# Patient Record
Sex: FEMALE | Race: WHITE | NOT HISPANIC OR LATINO | Employment: OTHER | URBAN - METROPOLITAN AREA
[De-identification: names, ages, dates, MRNs, and addresses within clinical notes are randomized per-mention and may not be internally consistent; named-entity substitution may affect disease eponyms.]

---

## 2023-03-09 ENCOUNTER — OFFICE VISIT (OUTPATIENT)
Dept: PRIMARY CARE | Facility: CLINIC | Age: 63
End: 2023-03-09
Payer: COMMERCIAL

## 2023-03-09 VITALS
HEART RATE: 70 BPM | SYSTOLIC BLOOD PRESSURE: 126 MMHG | BODY MASS INDEX: 32.77 KG/M2 | DIASTOLIC BLOOD PRESSURE: 80 MMHG | WEIGHT: 185 LBS

## 2023-03-09 DIAGNOSIS — M25.562 ACUTE PAIN OF LEFT KNEE: Primary | ICD-10-CM

## 2023-03-09 PROBLEM — M54.16 LUMBAR RADICULOPATHY: Status: ACTIVE | Noted: 2023-03-09

## 2023-03-09 PROBLEM — C50.919 BREAST CANCER (MULTI): Status: ACTIVE | Noted: 2023-03-09

## 2023-03-09 PROBLEM — E55.9 VITAMIN D DEFICIENCY: Status: ACTIVE | Noted: 2023-03-09

## 2023-03-09 PROBLEM — K63.5 POLYP OF COLON: Status: ACTIVE | Noted: 2023-03-09

## 2023-03-09 PROBLEM — H53.9 VISION DISTURBANCE: Status: ACTIVE | Noted: 2023-03-09

## 2023-03-09 PROBLEM — I10 HYPERTENSION: Status: ACTIVE | Noted: 2023-03-09

## 2023-03-09 PROBLEM — M25.512 CHRONIC LEFT SHOULDER PAIN: Status: ACTIVE | Noted: 2023-03-09

## 2023-03-09 PROBLEM — I44.7 LBBB (LEFT BUNDLE BRANCH BLOCK): Status: ACTIVE | Noted: 2023-03-09

## 2023-03-09 PROBLEM — G89.29 CHRONIC LEFT SHOULDER PAIN: Status: ACTIVE | Noted: 2023-03-09

## 2023-03-09 PROBLEM — R79.89 ELEVATED SERUM CREATININE: Status: ACTIVE | Noted: 2023-03-09

## 2023-03-09 PROBLEM — K21.9 ACID REFLUX DISEASE: Status: ACTIVE | Noted: 2023-03-09

## 2023-03-09 PROBLEM — M24.131 DEGENERATIVE TEAR OF TRIANGULAR FIBROCARTILAGE COMPLEX (TFCC) OF RIGHT WRIST: Status: ACTIVE | Noted: 2023-03-09

## 2023-03-09 PROBLEM — M17.12 PRIMARY OSTEOARTHRITIS OF LEFT KNEE: Status: ACTIVE | Noted: 2023-03-09

## 2023-03-09 PROBLEM — Q66.70 HIGH FOOT ARCH: Status: ACTIVE | Noted: 2023-03-09

## 2023-03-09 PROBLEM — E03.9 HYPOTHYROIDISM: Status: ACTIVE | Noted: 2023-03-09

## 2023-03-09 PROBLEM — N95.2 ATROPHY OF VAGINA: Status: ACTIVE | Noted: 2023-03-09

## 2023-03-09 PROBLEM — E78.5 HYPERLIPEMIA: Status: ACTIVE | Noted: 2023-03-09

## 2023-03-09 PROBLEM — M25.579 JOINT PAIN OF ANKLE AND FOOT, UNSPECIFIED LATERALITY: Status: ACTIVE | Noted: 2023-03-09

## 2023-03-09 PROBLEM — M48.061 LUMBAR CANAL STENOSIS: Status: ACTIVE | Noted: 2023-03-09

## 2023-03-09 PROCEDURE — 99214 OFFICE O/P EST MOD 30 MIN: CPT | Performed by: FAMILY MEDICINE

## 2023-03-09 PROCEDURE — 20610 DRAIN/INJ JOINT/BURSA W/O US: CPT | Performed by: FAMILY MEDICINE

## 2023-03-09 PROCEDURE — 3079F DIAST BP 80-89 MM HG: CPT | Performed by: FAMILY MEDICINE

## 2023-03-09 PROCEDURE — 4004F PT TOBACCO SCREEN RCVD TLK: CPT | Performed by: FAMILY MEDICINE

## 2023-03-09 PROCEDURE — 3074F SYST BP LT 130 MM HG: CPT | Performed by: FAMILY MEDICINE

## 2023-03-09 RX ORDER — MECLIZINE HYDROCHLORIDE 25 MG/1
25 TABLET ORAL 3 TIMES DAILY PRN
COMMUNITY
Start: 2022-08-17

## 2023-03-09 RX ORDER — ATORVASTATIN CALCIUM 20 MG/1
20 TABLET, FILM COATED ORAL DAILY
COMMUNITY
End: 2024-04-02 | Stop reason: ALTCHOICE

## 2023-03-09 RX ORDER — CYCLOSPORINE 0.5 MG/ML
1 EMULSION OPHTHALMIC EVERY 12 HOURS
COMMUNITY
Start: 2023-02-28

## 2023-03-09 RX ORDER — LATANOPROST 50 UG/ML
SOLUTION/ DROPS OPHTHALMIC
COMMUNITY
Start: 2018-08-10

## 2023-03-09 RX ORDER — PANTOPRAZOLE SODIUM 40 MG/1
40 TABLET, DELAYED RELEASE ORAL
COMMUNITY

## 2023-03-09 RX ORDER — ERYTHROMYCIN 5 MG/G
OINTMENT OPHTHALMIC NIGHTLY
COMMUNITY
Start: 2022-07-06

## 2023-03-09 RX ORDER — BENZONATATE 200 MG/1
200 CAPSULE ORAL 3 TIMES DAILY PRN
COMMUNITY
End: 2023-03-09

## 2023-03-09 RX ORDER — ROSUVASTATIN CALCIUM 40 MG/1
1 TABLET, COATED ORAL DAILY
COMMUNITY
Start: 2018-11-19 | End: 2023-04-27

## 2023-03-09 RX ORDER — OLMESARTAN MEDOXOMIL 40 MG/1
20 TABLET ORAL DAILY
COMMUNITY
End: 2023-08-01 | Stop reason: ALTCHOICE

## 2023-03-09 RX ORDER — SOD SULF/POT CHLORIDE/MAG SULF 1.479 G
TABLET ORAL
COMMUNITY
End: 2023-03-09 | Stop reason: ALTCHOICE

## 2023-03-09 RX ORDER — OMEPRAZOLE 40 MG/1
1 CAPSULE, DELAYED RELEASE ORAL DAILY
COMMUNITY
Start: 2021-09-27 | End: 2023-08-01 | Stop reason: WASHOUT

## 2023-03-09 RX ORDER — OLMESARTAN MEDOXOMIL AND HYDROCHLOROTHIAZIDE 40/25 40; 25 MG/1; MG/1
1 TABLET ORAL DAILY
COMMUNITY
Start: 2018-02-06 | End: 2023-06-06 | Stop reason: SDUPTHER

## 2023-03-09 RX ORDER — DOXYCYCLINE 100 MG/1
100 TABLET ORAL
COMMUNITY
End: 2023-03-09

## 2023-03-09 RX ORDER — TRIAMCINOLONE ACETONIDE 40 MG/ML
40 INJECTION, SUSPENSION INTRA-ARTICULAR; INTRAMUSCULAR ONCE
Status: COMPLETED | OUTPATIENT
Start: 2023-03-09 | End: 2023-05-22

## 2023-03-09 RX ORDER — LEVOTHYROXINE SODIUM 100 UG/1
1 TABLET ORAL DAILY
COMMUNITY
Start: 2018-08-10 | End: 2023-03-30 | Stop reason: SDUPTHER

## 2023-03-09 ASSESSMENT — PATIENT HEALTH QUESTIONNAIRE - PHQ9
2. FEELING DOWN, DEPRESSED OR HOPELESS: NOT AT ALL
SUM OF ALL RESPONSES TO PHQ9 QUESTIONS 1 AND 2: 0
1. LITTLE INTEREST OR PLEASURE IN DOING THINGS: NOT AT ALL

## 2023-03-09 NOTE — PROGRESS NOTES
Subjective   Italia Pierce is a 62 y.o. female who presents for left knee pain.    HPI    L knee pain:  States pain started yesterday after biking 8 miles, also biked 11 miles two days ago  Denies specific trauma or injury   Weight baring or pivoting motions makes the pain much worse   Feels unstable on her knee and is using walker today   Denies numbness, tingling    Has tried ibuprofen, ice, rest, elevation.     ROS:   All pertinent positive symptoms are included in the history of present illness.  All other systems have been reviewed and are negative and noncontributory to this patient's current ailments.    Objective     /80   Pulse 70   Wt 83.9 kg (185 lb)   BMI 32.77 kg/m²     Physical Exam  CONSTITUTIONAL - well nourished, well developed, looks like stated age, in no acute distress, not ill-appearing, and not tired appearing  SKIN - normal skin color and pigmentation, normal skin turgor without rash, lesions, or nodules visualized  HEAD - no trauma, normocephalic  EYES - extraocular muscles are intact, and normal external exam  EXTREMITIES - no obvious or evident edema, no obvious or evident deformities  MSK -left knee without any redness, swelling, ecchymosis; unable to reproduce any joint line tenderness; MCL tenderness, there is some slight swelling on the medial aspect, but again no redness or warmth; no LCL tenderness; negative anterior and posterior drawer, negative Sepideh    NEUROLOGICAL - antalgic gait, alert, oriented   PSYCHIATRIC - alert, pleasant and cordial, age-appropriate    Patient ID: Italia Pierce is a 62 y.o. female.    Joint Injection Large/Arthrocentesis: L knee on 3/9/2023 3:28 PM  Indications: pain  Details: 22 G needle, anterolateral approach  Outcome: tolerated well, no immediate complications  Procedure, treatment alternatives, risks and benefits explained, specific risks discussed. Immediately prior to procedure a time out was called to verify the correct patient,  "procedure, equipment, support staff and site/side marked as required. Patient was prepped and draped in the usual sterile fashion.       Assessment/Plan   Acute pain of left knee  Concern for possible meniscal versus MCL injury Steroid injection provided today     Indications, potential risks, complications and side effects, alternatives, and potential outcomes for the injection procedure was discussed with the patient: joint was prepped in a sterile fashion, 22 gauge with 1.5\" length needle was used for injection.    2% lidocaine (2 mL) and Kenalog 40 mg injected into the joint as noted on the examination. It was successful, without complication, and tolerated extremely well.    If there is no pain within the next week, we talked about considering an MRI to further evaluate for underlying intra articular damage.  I reviewed x-ray from previous visit, there was some mild narrowing, but no other significant arthritic changes.    Please return to the clinic if pain, swelling, or signs of infection occur. It will be necessary to further evaluate you at that time, along with considering the possibility of an orthopedic consultation to provide further medical management  "

## 2023-03-13 ENCOUNTER — TELEPHONE (OUTPATIENT)
Dept: PRIMARY CARE | Facility: CLINIC | Age: 63
End: 2023-03-13
Payer: COMMERCIAL

## 2023-03-13 DIAGNOSIS — M25.562 ACUTE PAIN OF LEFT KNEE: ICD-10-CM

## 2023-03-13 DIAGNOSIS — M17.12 PRIMARY OSTEOARTHRITIS OF LEFT KNEE: Primary | ICD-10-CM

## 2023-03-22 DIAGNOSIS — E03.9 HYPOTHYROIDISM, UNSPECIFIED: ICD-10-CM

## 2023-03-23 RX ORDER — LEVOTHYROXINE SODIUM 100 UG/1
TABLET ORAL
Qty: 90 TABLET | Refills: 3 | OUTPATIENT
Start: 2023-03-23

## 2023-03-26 DIAGNOSIS — S82.192A: Primary | ICD-10-CM

## 2023-03-26 DIAGNOSIS — S83.242A OTHER TEAR OF MEDIAL MENISCUS OF LEFT KNEE AS CURRENT INJURY, INITIAL ENCOUNTER: ICD-10-CM

## 2023-03-26 NOTE — PROGRESS NOTES
MRI of the left knee reveals a bone marrow bruise, full-thickness osteochondral defect in the medial tibial condyle and a horizontal tear of the medial meniscus.  I reviewed this on the phone with Italia a few moments ago, and we agreed to get her set up with orthopedic surgeon to discuss treatment options further

## 2023-03-30 RX ORDER — LEVOTHYROXINE SODIUM 100 UG/1
100 TABLET ORAL DAILY
Qty: 90 TABLET | Refills: 1 | Status: SHIPPED | OUTPATIENT
Start: 2023-03-30 | End: 2023-08-10 | Stop reason: SDUPTHER

## 2023-04-26 DIAGNOSIS — E78.5 HYPERLIPIDEMIA, UNSPECIFIED: ICD-10-CM

## 2023-04-27 RX ORDER — ROSUVASTATIN CALCIUM 40 MG/1
TABLET, COATED ORAL
Qty: 30 TABLET | Refills: 0 | Status: SHIPPED | OUTPATIENT
Start: 2023-04-27 | End: 2023-06-02

## 2023-05-22 ENCOUNTER — OFFICE VISIT (OUTPATIENT)
Dept: PRIMARY CARE | Facility: CLINIC | Age: 63
End: 2023-05-22
Payer: COMMERCIAL

## 2023-05-22 VITALS
SYSTOLIC BLOOD PRESSURE: 130 MMHG | BODY MASS INDEX: 32.77 KG/M2 | DIASTOLIC BLOOD PRESSURE: 70 MMHG | WEIGHT: 185 LBS | HEART RATE: 70 BPM

## 2023-05-22 DIAGNOSIS — Z17.1 MALIGNANT NEOPLASM OF UPPER-INNER QUADRANT OF RIGHT BREAST IN FEMALE, ESTROGEN RECEPTOR NEGATIVE (MULTI): ICD-10-CM

## 2023-05-22 DIAGNOSIS — M17.12 PRIMARY OSTEOARTHRITIS OF LEFT KNEE: Primary | ICD-10-CM

## 2023-05-22 DIAGNOSIS — C50.211 MALIGNANT NEOPLASM OF UPPER-INNER QUADRANT OF RIGHT BREAST IN FEMALE, ESTROGEN RECEPTOR NEGATIVE (MULTI): ICD-10-CM

## 2023-05-22 PROBLEM — J06.9 URI, ACUTE: Status: ACTIVE | Noted: 2023-05-22

## 2023-05-22 PROBLEM — S82.202A FRACTURE OF LEFT TIBIA: Status: ACTIVE | Noted: 2023-05-22

## 2023-05-22 PROBLEM — M25.562 LEFT KNEE PAIN: Status: ACTIVE | Noted: 2023-05-22

## 2023-05-22 PROCEDURE — 20610 DRAIN/INJ JOINT/BURSA W/O US: CPT | Performed by: FAMILY MEDICINE

## 2023-05-22 PROCEDURE — 1160F RVW MEDS BY RX/DR IN RCRD: CPT | Performed by: FAMILY MEDICINE

## 2023-05-22 PROCEDURE — 4004F PT TOBACCO SCREEN RCVD TLK: CPT | Performed by: FAMILY MEDICINE

## 2023-05-22 PROCEDURE — 3078F DIAST BP <80 MM HG: CPT | Performed by: FAMILY MEDICINE

## 2023-05-22 PROCEDURE — 99213 OFFICE O/P EST LOW 20 MIN: CPT | Performed by: FAMILY MEDICINE

## 2023-05-22 PROCEDURE — 3075F SYST BP GE 130 - 139MM HG: CPT | Performed by: FAMILY MEDICINE

## 2023-05-22 PROCEDURE — 1159F MED LIST DOCD IN RCRD: CPT | Performed by: FAMILY MEDICINE

## 2023-05-22 RX ORDER — OFLOXACIN 3 MG/ML
SOLUTION AURICULAR (OTIC)
COMMUNITY
Start: 2023-05-13 | End: 2023-08-01 | Stop reason: WASHOUT

## 2023-05-22 RX ADMIN — TRIAMCINOLONE ACETONIDE 40 MG: 40 INJECTION, SUSPENSION INTRA-ARTICULAR; INTRAMUSCULAR at 13:15

## 2023-05-22 ASSESSMENT — PATIENT HEALTH QUESTIONNAIRE - PHQ9
SUM OF ALL RESPONSES TO PHQ9 QUESTIONS 1 AND 2: 0
2. FEELING DOWN, DEPRESSED OR HOPELESS: NOT AT ALL
1. LITTLE INTEREST OR PLEASURE IN DOING THINGS: NOT AT ALL

## 2023-05-22 ASSESSMENT — ENCOUNTER SYMPTOMS
OCCASIONAL FEELINGS OF UNSTEADINESS: 0
DEPRESSION: 0
LOSS OF SENSATION IN FEET: 0

## 2023-05-22 NOTE — PROGRESS NOTES
Subjective   Italia Pierce is a 63 y.o. female who presents for cortisone injection (Left knee)    HPI  Presents for repeat left knee injection.  She has had several injections now, and they seem to last around 2 months for her.  States that her last injection wore off 1 to 2 weeks ago.  She has been putting off surgery, and wants to at least make it through the summer before going down that road.  Denies interval injury to the left knee.      ROS: All pertinent positive symptoms are included in the history of present illness.    All other systems have been reviewed and are negative and noncontributory to this patient's current ailments.    Objective     Vitals:    05/22/23 1302   BP: 130/70   Pulse: 70   Weight: 83.9 kg (185 lb)       Physical Exam  CONSTITUTIONAL - well nourished, well developed, looks like stated age, in no acute distress, not ill-appearing, and not tired appearing  SKIN - No lesions or rashes visualized.   HEAD - Atraumatic, normocephalic.  EYES - EOMI with normal external exam  RESP - respiration not labored   CARDIAC - extremities warm, well-perfused  PSYCHIATRIC - alert, oriented to time, place, person and no difficulty with speech or language  MUSCULOSKELETAL -left knee demonstrates medial joint line tenderness palpation, stable varus/valgus stress    Patient ID: Italia Pierce is a 63 y.o. female.    Joint Injection Large/Arthrocentesis: L knee on 5/22/2023 1:15 PM  Indications: pain  Details: 22 G needle, anterolateral approach  Medications: 40 mg triamcinolone acetonide (Kenalog-40) injection 40 mg/mL (40 mg triamcinolone, 2 mL 2% lidocaine)  Outcome: tolerated well, no immediate complications  Procedure, treatment alternatives, risks and benefits explained, specific risks discussed. Consent was given by the patient.       Assessment/Plan   Problem List Items Addressed This Visit       Breast cancer (CMS/[facility])    Primary osteoarthritis of left knee - Primary     Left knee cortisone  injection provided today.  We can continue to perform these every 3 to 6 months as necessary         Relevant Orders    Joint Injection Large/Arthrocentesis: L knee (Completed)

## 2023-05-22 NOTE — ASSESSMENT & PLAN NOTE
Left knee cortisone injection provided today.  We can continue to perform these every 3 to 6 months as necessary

## 2023-06-01 DIAGNOSIS — E78.5 HYPERLIPIDEMIA, UNSPECIFIED: ICD-10-CM

## 2023-06-02 RX ORDER — ROSUVASTATIN CALCIUM 40 MG/1
40 TABLET, COATED ORAL DAILY
Qty: 30 TABLET | Refills: 0 | Status: SHIPPED | OUTPATIENT
Start: 2023-06-02 | End: 2023-07-06

## 2023-06-06 DIAGNOSIS — I10 HYPERTENSION, UNSPECIFIED TYPE: ICD-10-CM

## 2023-06-06 PROBLEM — Z85.3 HISTORY OF BREAST CANCER: Status: ACTIVE | Noted: 2023-06-06

## 2023-06-06 RX ORDER — OLMESARTAN MEDOXOMIL AND HYDROCHLOROTHIAZIDE 40/25 40; 25 MG/1; MG/1
1 TABLET ORAL DAILY
Qty: 30 TABLET | Refills: 0 | Status: SHIPPED | OUTPATIENT
Start: 2023-06-06 | End: 2023-07-06

## 2023-06-20 ENCOUNTER — APPOINTMENT (OUTPATIENT)
Dept: PRIMARY CARE | Facility: CLINIC | Age: 63
End: 2023-06-20
Payer: COMMERCIAL

## 2023-07-06 DIAGNOSIS — E78.5 HYPERLIPIDEMIA, UNSPECIFIED: ICD-10-CM

## 2023-07-06 DIAGNOSIS — I10 HYPERTENSION, UNSPECIFIED TYPE: ICD-10-CM

## 2023-07-06 RX ORDER — ROSUVASTATIN CALCIUM 40 MG/1
40 TABLET, COATED ORAL DAILY
Qty: 30 TABLET | Refills: 0 | Status: SHIPPED | OUTPATIENT
Start: 2023-07-06 | End: 2023-08-01 | Stop reason: SDUPTHER

## 2023-07-06 RX ORDER — OLMESARTAN MEDOXOMIL AND HYDROCHLOROTHIAZIDE 40/25 40; 25 MG/1; MG/1
1 TABLET ORAL DAILY
Qty: 30 TABLET | Refills: 0 | Status: SHIPPED | OUTPATIENT
Start: 2023-07-06 | End: 2023-08-01 | Stop reason: SDUPTHER

## 2023-07-31 ENCOUNTER — APPOINTMENT (OUTPATIENT)
Dept: PRIMARY CARE | Facility: CLINIC | Age: 63
End: 2023-07-31
Payer: COMMERCIAL

## 2023-07-31 DIAGNOSIS — E78.5 HYPERLIPIDEMIA, UNSPECIFIED: ICD-10-CM

## 2023-08-01 ENCOUNTER — OFFICE VISIT (OUTPATIENT)
Dept: PRIMARY CARE | Facility: CLINIC | Age: 63
End: 2023-08-01
Payer: COMMERCIAL

## 2023-08-01 VITALS
HEART RATE: 75 BPM | HEIGHT: 64 IN | WEIGHT: 190 LBS | DIASTOLIC BLOOD PRESSURE: 64 MMHG | SYSTOLIC BLOOD PRESSURE: 110 MMHG | BODY MASS INDEX: 32.44 KG/M2

## 2023-08-01 DIAGNOSIS — I10 BENIGN ESSENTIAL HYPERTENSION: Primary | ICD-10-CM

## 2023-08-01 DIAGNOSIS — F17.210 CIGARETTE SMOKER: ICD-10-CM

## 2023-08-01 DIAGNOSIS — E55.9 VITAMIN D DEFICIENCY: ICD-10-CM

## 2023-08-01 DIAGNOSIS — Z12.83 SKIN CANCER SCREENING: ICD-10-CM

## 2023-08-01 DIAGNOSIS — E78.2 MIXED HYPERLIPIDEMIA: ICD-10-CM

## 2023-08-01 PROBLEM — M25.562 LEFT KNEE PAIN: Status: RESOLVED | Noted: 2023-05-22 | Resolved: 2023-08-01

## 2023-08-01 PROBLEM — S82.202A FRACTURE OF LEFT TIBIA: Status: RESOLVED | Noted: 2023-05-22 | Resolved: 2023-08-01

## 2023-08-01 PROBLEM — J06.9 URI, ACUTE: Status: RESOLVED | Noted: 2023-05-22 | Resolved: 2023-08-01

## 2023-08-01 PROCEDURE — 3074F SYST BP LT 130 MM HG: CPT | Performed by: FAMILY MEDICINE

## 2023-08-01 PROCEDURE — 3078F DIAST BP <80 MM HG: CPT | Performed by: FAMILY MEDICINE

## 2023-08-01 PROCEDURE — 1160F RVW MEDS BY RX/DR IN RCRD: CPT | Performed by: FAMILY MEDICINE

## 2023-08-01 PROCEDURE — 4004F PT TOBACCO SCREEN RCVD TLK: CPT | Performed by: FAMILY MEDICINE

## 2023-08-01 PROCEDURE — 1159F MED LIST DOCD IN RCRD: CPT | Performed by: FAMILY MEDICINE

## 2023-08-01 PROCEDURE — 99214 OFFICE O/P EST MOD 30 MIN: CPT | Performed by: FAMILY MEDICINE

## 2023-08-01 RX ORDER — ROSUVASTATIN CALCIUM 40 MG/1
40 TABLET, COATED ORAL DAILY
Qty: 30 TABLET | Refills: 0 | OUTPATIENT
Start: 2023-08-01

## 2023-08-01 RX ORDER — ROSUVASTATIN CALCIUM 40 MG/1
40 TABLET, COATED ORAL DAILY
Qty: 90 TABLET | Refills: 1 | Status: SHIPPED | OUTPATIENT
Start: 2023-08-01 | End: 2024-02-21

## 2023-08-01 RX ORDER — OLMESARTAN MEDOXOMIL AND HYDROCHLOROTHIAZIDE 40/25 40; 25 MG/1; MG/1
1 TABLET ORAL DAILY
Qty: 90 TABLET | Refills: 1 | Status: SHIPPED | OUTPATIENT
Start: 2023-08-01 | End: 2023-08-22 | Stop reason: SINTOL

## 2023-08-01 ASSESSMENT — PATIENT HEALTH QUESTIONNAIRE - PHQ9
1. LITTLE INTEREST OR PLEASURE IN DOING THINGS: NOT AT ALL
2. FEELING DOWN, DEPRESSED OR HOPELESS: NOT AT ALL
SUM OF ALL RESPONSES TO PHQ9 QUESTIONS 1 AND 2: 0

## 2023-08-01 NOTE — ASSESSMENT & PLAN NOTE
Really should consider getting back on vitamin D secondary to your breast cancer history  Obtain vitamin D level, labs pending  We will notify of test results once available

## 2023-08-01 NOTE — PROGRESS NOTES
Subjective   Patient ID: Italia Pierce is a 63 y.o. female who presents for Hypertension and Hyperlipidemia.    HPI  1.  Hypertension.  Patient is requesting refill of her olmesartan 40 mg/hydrochlorothiazide 25 mg.   She is tolerating the medication well. Denies any dizziness or swelling in her legs.   Records her blood pressure 3 times weekly.  Average BP is around 110/60 at home.    2.  Hyperlipidemia.  Not fasting for blood work today, but willing to have done in near future  Currently taking rosuvastatin 40 mg daily, tolerating well  Last lipid panel done October 2022, noted to be normal    3.  Longtime cigarette smoker.  Started smoking age 23, currently 63 years of age, and still smoking one half PPD, or 20-pack-year history    4.  Vitamin D deficiency.  History of breast cancer, no longer taking vitamin D  Apparently oncology took her off the vitamin D, but she has not had the level checked in a while  Out of the sun quite often during the summer, especially enjoys golfing, but in the winter she is indoors    5.  Skin lesions.  She reports new nodular dry skin patches appearing sporadically on her legs over the year  They do not itch but she would like treatment if possible.    Review of Systems  All pertinent positive symptoms are included in the history of present illness.    All other systems have been reviewed and are negative and noncontributory to this patient's current ailments.    Current Outpatient Medications   Medication Instructions    atorvastatin (LIPITOR) 20 mg, oral, Daily    erythromycin (Romycin) 5 mg/gram (0.5 %) ophthalmic ointment Both Eyes, Nightly    latanoprost (Xalatan) 0.005 % ophthalmic solution ophthalmic (eye)    levothyroxine (SYNTHROID, LEVOXYL) 100 mcg, oral, Daily    meclizine (ANTIVERT) 25 mg, oral, 3 times daily PRN    olmesartan-hydrochlorothiazide (BENIcar HCT) 40-25 mg tablet 1 tablet, oral, Daily    pantoprazole (PROTONIX) 40 mg, oral, Daily before breakfast     "Restasis 0.05 % ophthalmic emulsion 1 drop, Both Eyes, Every 12 hours    rosuvastatin (CRESTOR) 40 mg, oral, Daily     Allergies   Allergen Reactions    Gadolinium-Containing Contrast Media Nausea Only, Other and Rash    Prochlorperazine Swelling       Immunization History   Administered Date(s) Administered    Influenza, seasonal, injectable 11/01/2022    Pfizer Purple Cap SARS-CoV-2 03/17/2021, 04/16/2021, 11/02/2021    Pneumococcal polysaccharide vaccine, 23-valent, age 2 years and older (PNEUMOVAX 23) 02/05/2020    Zoster, live 11/08/2022     Past Surgical History:   Procedure Laterality Date    BREAST LUMPECTOMY  06/16/2014    Right Breast Lumpectomy    OTHER SURGICAL HISTORY  10/18/2013    Liposuction    OTHER SURGICAL HISTORY  10/18/2013    Otoplasty    OTHER SURGICAL HISTORY  02/10/2020    [place] lymph node biopsy procedure    REFRACTIVE SURGERY  10/18/2013    Corneal LASIK    TONSILLECTOMY  10/18/2013    Tonsillectomy     Family History   Problem Relation Name Age of Onset    Macular degeneration Mother      Cancer Maternal Grandmother      Diabetes Maternal Grandmother      Cancer Maternal Grandfather       Social History     Tobacco Use    Smoking status: Every Day     Packs/day: 0.50     Years: 40.00     Total pack years: 20.00     Types: Cigarettes    Smokeless tobacco: Never   Substance Use Topics    Alcohol use: Yes    Drug use: Never       Objective   Visit Vitals  /64   Pulse 75   Ht 1.626 m (5' 4\")   Wt 86.2 kg (190 lb)   BMI 32.61 kg/m²   Smoking Status Every Day   BSA 1.97 m²       Physical Exam  CONSTITUTIONAL - well nourished, well developed, looks like stated age, in no acute distress, not ill-appearing, and not tired appearing  SKIN - fleshy skin colored nodules/dry patches on the lower extremity  HEAD - no trauma, normocephalic  CHEST - clear to auscultation, no wheezing, no crackles and no rales, good effort  CARDIAC - + 2 systolic murmur; regular rate and regular rhythm, no skipped " beats  EXTREMITIES - no edema, no deformities  NEUROLOGICAL - normal gait, normal balance, normal motor, no ataxia  PSYCHIATRIC - alert, pleasant and cordial, age-appropriate    Assessment/Plan   Problem List Items Addressed This Visit       Hyperlipemia     Continue rosuvastatin at current dose, labs fasting at your convenience  We will notify of test results once available         Relevant Medications    rosuvastatin (Crestor) 40 mg tablet    Other Relevant Orders    Comprehensive metabolic panel    Lipid Panel    Benign essential hypertension - Primary     Stable, no changes to medication recommended  I would like to have you monitor and record blood pressures at home   Blood pressure goal should be below 130/80, ideally 120/80  If the blood pressure is too high or too low, we need to consider making adjustments to your antihypertensive therapy         Relevant Medications    olmesartan-hydrochlorothiazide (BENIcar HCT) 40-25 mg tablet    Other Relevant Orders    Comprehensive metabolic panel    Vitamin D deficiency     Really should consider getting back on vitamin D secondary to your breast cancer history  Obtain vitamin D level, labs pending  We will notify of test results once available         Relevant Orders    Vitamin D 25-Hydroxy,Total (for eval of Vitamin D levels)    Cigarette smoker     Tried Wellbutrin in the past without benefit, secondary to intolerability  Interested in obtaining CT low-dose to further evaluate for lung cancer  Agree that you may try Accu laser therapy for smoking cessation    During our discussion on smoking cessation, here are the key highlights for you to consider:    1. Health Benefits: Quitting smoking has numerous health benefits, including reducing the risk of heart disease, stroke, lung cancer, and respiratory issues. It also improves overall lung function and increases life expectancy.  2. Immediate Effects: Within hours of quitting, your body starts to experience positive  changes. Blood pressure and heart rate decrease, and the carbon monoxide levels in your blood normalize.  3. Long-term Benefits: Over time, quitting smoking significantly lowers the risk of developing chronic diseases and improves overall quality of life. It also reduces the risk of secondhand smoke-related health issues in those around you.  4. Support Systems: Utilize support systems to help you quit successfully. This can include friends, family, support groups, or professional assistance through counseling or smoking cessation programs.  5. Nicotine Replacement Therapy (NRT): Consider using NRT options such as nicotine patches, gum, lozenges, or inhalers. These can help manage nicotine cravings and withdrawal symptoms.  6. Prescription Medications: Certain medications, such as bupropion or varenicline, may be prescribed to assist with smoking cessation.   7. Coping Strategies: Develop healthy coping mechanisms to deal with triggers and cravings. This may involve finding alternative activities, practicing stress-reducing techniques, or seeking professional help if needed.  8. Lifestyle Changes: Adopting a healthy lifestyle can support your smoking cessation journey. This includes regular exercise, a balanced diet, and stress management techniques.  9. Relapse Prevention: Understand that quitting smoking is a process, and setbacks can occur. Learn from any relapses and use them as an opportunity to strengthen your resolve to quit.  10. Follow-up Support: Regularly follow up with us to monitor your progress, address any concerns, and ensure ongoing support throughout your journey to quit smoking.    Remember, quitting smoking is a challenging but highly rewarding endeavor for your health and well-being.         Relevant Orders    CT lung screening low dose     Other Visit Diagnoses       Skin cancer screening        Multiple lesions on body, some of which look like skin cancer, so dermatology referral recommended,  schedule at earliest convenience    Relevant Orders    Referral to Dermatology

## 2023-08-01 NOTE — ASSESSMENT & PLAN NOTE
Continue rosuvastatin at current dose, labs fasting at your convenience  We will notify of test results once available

## 2023-08-01 NOTE — ASSESSMENT & PLAN NOTE
Tried Wellbutrin in the past without benefit, secondary to intolerability  Interested in obtaining CT low-dose to further evaluate for lung cancer  Agree that you may try Accu laser therapy for smoking cessation    During our discussion on smoking cessation, here are the key highlights for you to consider:    1. Health Benefits: Quitting smoking has numerous health benefits, including reducing the risk of heart disease, stroke, lung cancer, and respiratory issues. It also improves overall lung function and increases life expectancy.  2. Immediate Effects: Within hours of quitting, your body starts to experience positive changes. Blood pressure and heart rate decrease, and the carbon monoxide levels in your blood normalize.  3. Long-term Benefits: Over time, quitting smoking significantly lowers the risk of developing chronic diseases and improves overall quality of life. It also reduces the risk of secondhand smoke-related health issues in those around you.  4. Support Systems: Utilize support systems to help you quit successfully. This can include friends, family, support groups, or professional assistance through counseling or smoking cessation programs.  5. Nicotine Replacement Therapy (NRT): Consider using NRT options such as nicotine patches, gum, lozenges, or inhalers. These can help manage nicotine cravings and withdrawal symptoms.  6. Prescription Medications: Certain medications, such as bupropion or varenicline, may be prescribed to assist with smoking cessation.   7. Coping Strategies: Develop healthy coping mechanisms to deal with triggers and cravings. This may involve finding alternative activities, practicing stress-reducing techniques, or seeking professional help if needed.  8. Lifestyle Changes: Adopting a healthy lifestyle can support your smoking cessation journey. This includes regular exercise, a balanced diet, and stress management techniques.  9. Relapse Prevention: Understand that quitting  smoking is a process, and setbacks can occur. Learn from any relapses and use them as an opportunity to strengthen your resolve to quit.  10. Follow-up Support: Regularly follow up with us to monitor your progress, address any concerns, and ensure ongoing support throughout your journey to quit smoking.    Remember, quitting smoking is a challenging but highly rewarding endeavor for your health and well-being.

## 2023-08-09 ENCOUNTER — LAB (OUTPATIENT)
Dept: LAB | Facility: LAB | Age: 63
End: 2023-08-09
Payer: COMMERCIAL

## 2023-08-09 DIAGNOSIS — E78.2 MIXED HYPERLIPIDEMIA: ICD-10-CM

## 2023-08-09 DIAGNOSIS — I10 BENIGN ESSENTIAL HYPERTENSION: ICD-10-CM

## 2023-08-09 DIAGNOSIS — E55.9 VITAMIN D DEFICIENCY: ICD-10-CM

## 2023-08-09 LAB
ALANINE AMINOTRANSFERASE (SGPT) (U/L) IN SER/PLAS: 26 U/L (ref 7–45)
ALBUMIN (G/DL) IN SER/PLAS: 4.6 G/DL (ref 3.4–5)
ALKALINE PHOSPHATASE (U/L) IN SER/PLAS: 61 U/L (ref 33–136)
ANION GAP IN SER/PLAS: 16 MMOL/L (ref 10–20)
ASPARTATE AMINOTRANSFERASE (SGOT) (U/L) IN SER/PLAS: 22 U/L (ref 9–39)
BILIRUBIN TOTAL (MG/DL) IN SER/PLAS: 0.6 MG/DL (ref 0–1.2)
CALCIDIOL (25 OH VITAMIN D3) (NG/ML) IN SER/PLAS: 43 NG/ML
CALCIUM (MG/DL) IN SER/PLAS: 10.1 MG/DL (ref 8.6–10.6)
CARBON DIOXIDE, TOTAL (MMOL/L) IN SER/PLAS: 29 MMOL/L (ref 21–32)
CHLORIDE (MMOL/L) IN SER/PLAS: 100 MMOL/L (ref 98–107)
CHOLESTEROL (MG/DL) IN SER/PLAS: 209 MG/DL (ref 0–199)
CHOLESTEROL IN HDL (MG/DL) IN SER/PLAS: 93.6 MG/DL
CHOLESTEROL/HDL RATIO: 2.2
CREATININE (MG/DL) IN SER/PLAS: 1.12 MG/DL (ref 0.5–1.05)
GFR FEMALE: 54 ML/MIN/1.73M2
GLUCOSE (MG/DL) IN SER/PLAS: 84 MG/DL (ref 74–99)
LDL: 92 MG/DL (ref 0–99)
POTASSIUM (MMOL/L) IN SER/PLAS: 3.7 MMOL/L (ref 3.5–5.3)
PROTEIN TOTAL: 7.1 G/DL (ref 6.4–8.2)
SODIUM (MMOL/L) IN SER/PLAS: 141 MMOL/L (ref 136–145)
TRIGLYCERIDE (MG/DL) IN SER/PLAS: 117 MG/DL (ref 0–149)
UREA NITROGEN (MG/DL) IN SER/PLAS: 18 MG/DL (ref 6–23)
VLDL: 23 MG/DL (ref 0–40)

## 2023-08-09 PROCEDURE — 82306 VITAMIN D 25 HYDROXY: CPT

## 2023-08-09 PROCEDURE — 36415 COLL VENOUS BLD VENIPUNCTURE: CPT

## 2023-08-09 PROCEDURE — 80053 COMPREHEN METABOLIC PANEL: CPT

## 2023-08-09 PROCEDURE — 80061 LIPID PANEL: CPT

## 2023-08-10 DIAGNOSIS — E03.9 HYPOTHYROIDISM, UNSPECIFIED: ICD-10-CM

## 2023-08-10 RX ORDER — LEVOTHYROXINE SODIUM 100 UG/1
100 TABLET ORAL DAILY
Qty: 90 TABLET | Refills: 1 | Status: SHIPPED | OUTPATIENT
Start: 2023-08-10 | End: 2024-02-21

## 2023-08-10 NOTE — RESULT ENCOUNTER NOTE
Cholesterol 209, 93, 92, 117 which is pretty good across-the-board  Sugar, electrolytes, liver, vitamin D excellent  Kidney function showing very slight decline, but stable compared to previous so we will continue to monitor.  I suspect this is secondary to the water pill that you are taking  No changes to medication recommended

## 2023-08-22 DIAGNOSIS — I10 BENIGN ESSENTIAL HYPERTENSION: Primary | ICD-10-CM

## 2023-08-22 RX ORDER — OLMESARTAN MEDOXOMIL 40 MG/1
40 TABLET ORAL DAILY
Qty: 90 TABLET | Refills: 1 | Status: SHIPPED | OUTPATIENT
Start: 2023-08-22 | End: 2024-02-26

## 2023-09-11 ENCOUNTER — OFFICE VISIT (OUTPATIENT)
Dept: PRIMARY CARE | Facility: CLINIC | Age: 63
End: 2023-09-11
Payer: COMMERCIAL

## 2023-09-11 VITALS
BODY MASS INDEX: 32.79 KG/M2 | DIASTOLIC BLOOD PRESSURE: 80 MMHG | WEIGHT: 191 LBS | SYSTOLIC BLOOD PRESSURE: 126 MMHG | HEART RATE: 70 BPM

## 2023-09-11 DIAGNOSIS — M17.12 PRIMARY OSTEOARTHRITIS OF LEFT KNEE: Primary | ICD-10-CM

## 2023-09-11 DIAGNOSIS — Z23 FLU VACCINE NEED: ICD-10-CM

## 2023-09-11 PROBLEM — D22.5 MELANOCYTIC NEVI OF TRUNK: Status: ACTIVE | Noted: 2017-10-18

## 2023-09-11 PROBLEM — L57.0 ACTINIC KERATOSIS: Status: ACTIVE | Noted: 2017-10-18

## 2023-09-11 PROBLEM — D48.5 NEOPLASM OF UNCERTAIN BEHAVIOR OF SKIN: Status: ACTIVE | Noted: 2017-10-18

## 2023-09-11 PROCEDURE — 99213 OFFICE O/P EST LOW 20 MIN: CPT | Performed by: FAMILY MEDICINE

## 2023-09-11 PROCEDURE — 90471 IMMUNIZATION ADMIN: CPT | Performed by: FAMILY MEDICINE

## 2023-09-11 PROCEDURE — 1159F MED LIST DOCD IN RCRD: CPT | Performed by: FAMILY MEDICINE

## 2023-09-11 PROCEDURE — 1160F RVW MEDS BY RX/DR IN RCRD: CPT | Performed by: FAMILY MEDICINE

## 2023-09-11 PROCEDURE — 3074F SYST BP LT 130 MM HG: CPT | Performed by: FAMILY MEDICINE

## 2023-09-11 PROCEDURE — 90662 IIV NO PRSV INCREASED AG IM: CPT | Performed by: FAMILY MEDICINE

## 2023-09-11 PROCEDURE — 3079F DIAST BP 80-89 MM HG: CPT | Performed by: FAMILY MEDICINE

## 2023-09-11 PROCEDURE — 20610 DRAIN/INJ JOINT/BURSA W/O US: CPT | Performed by: FAMILY MEDICINE

## 2023-09-11 PROCEDURE — 4004F PT TOBACCO SCREEN RCVD TLK: CPT | Performed by: FAMILY MEDICINE

## 2023-09-11 RX ORDER — TRIAMCINOLONE ACETONIDE 40 MG/ML
40 INJECTION, SUSPENSION INTRA-ARTICULAR; INTRAMUSCULAR ONCE
Status: COMPLETED | OUTPATIENT
Start: 2023-09-11 | End: 2023-09-11

## 2023-09-11 RX ADMIN — TRIAMCINOLONE ACETONIDE 40 MG: 40 INJECTION, SUSPENSION INTRA-ARTICULAR; INTRAMUSCULAR at 16:04

## 2023-09-11 NOTE — PROGRESS NOTES
Subjective   Patient ID: Italia Pierce is a 63 y.o. female who presents for Knee Pain (Cortisone injection in the left knee).    Knee Pain     1.  Osteoarthritis of left knee  Presents for repeat left knee injection    She has had several injections now, and they seem to last around 2-3 months for her States that her last injection wore off 1 to 2 weeks ago  Denies interval injury to the left knee    Review of Systems  All pertinent positive symptoms are included in the history of present illness.    All other systems have been reviewed and are negative and noncontributory to this patient's current ailments.    Current Outpatient Medications   Medication Instructions    atorvastatin (LIPITOR) 20 mg, oral, Daily    erythromycin (Romycin) 5 mg/gram (0.5 %) ophthalmic ointment Both Eyes, Nightly    latanoprost (Xalatan) 0.005 % ophthalmic solution ophthalmic (eye)    levothyroxine (SYNTHROID, LEVOXYL) 100 mcg, oral, Daily    meclizine (ANTIVERT) 25 mg, oral, 3 times daily PRN    olmesartan (BENICAR) 40 mg, oral, Daily    pantoprazole (PROTONIX) 40 mg, oral, Daily before breakfast    Restasis 0.05 % ophthalmic emulsion 1 drop, Both Eyes, Every 12 hours    rosuvastatin (CRESTOR) 40 mg, oral, Daily     Allergies   Allergen Reactions    Gadolinium-Containing Contrast Media Nausea Only, Other and Rash    Prochlorperazine Swelling       Immunization History   Administered Date(s) Administered    Flu vaccine, quadrivalent, high-dose, preservative free, age 65y+ (FLUZONE) 09/11/2023    Influenza, seasonal, injectable 11/01/2022    Pfizer Purple Cap SARS-CoV-2 03/17/2021, 04/16/2021, 11/02/2021    Pneumococcal polysaccharide vaccine, 23-valent, age 2 years and older (PNEUMOVAX 23) 02/05/2020    Zoster, live 11/08/2022     Past Surgical History:   Procedure Laterality Date    BREAST LUMPECTOMY  06/16/2014    Right Breast Lumpectomy    OTHER SURGICAL HISTORY  10/18/2013    Liposuction    OTHER SURGICAL HISTORY  10/18/2013     Otoplasty    OTHER SURGICAL HISTORY  02/10/2020    [place] lymph node biopsy procedure    REFRACTIVE SURGERY  10/18/2013    Corneal LASIK    TONSILLECTOMY  10/18/2013    Tonsillectomy     Family History   Problem Relation Name Age of Onset    Macular degeneration Mother      Cancer Maternal Grandmother      Diabetes Maternal Grandmother      Cancer Maternal Grandfather       Social History     Tobacco Use    Smoking status: Every Day     Packs/day: 0.50     Years: 40.00     Additional pack years: 0.00     Total pack years: 20.00     Types: Cigarettes    Smokeless tobacco: Never   Substance Use Topics    Alcohol use: Yes    Drug use: Never       Objective   Visit Vitals  /80   Pulse 70   Wt 86.6 kg (191 lb)   BMI 32.79 kg/m²   Smoking Status Every Day   BSA 1.98 m²       Physical Exam  CONSTITUTIONAL - well nourished, well developed, looks like stated age, in no acute distress, not ill-appearing, and not tired appearing  SKIN - No lesions or rashes visualized.   HEAD - Atraumatic, normocephalic.  EYES - EOMI with normal external exam  RESP - respiration not labored   CARDIAC - extremities warm, well-perfused  PSYCHIATRIC - alert, oriented to time, place, person and no difficulty with speech or language  MUSCULOSKELETAL - left knee demonstrates medial joint line tenderness palpation, stable varus/valgus stress    Patient ID: Italia Pierce is a 63 y.o. female.    Joint Injection Large/Arthrocentesis: L knee on 9/11/2023 11:46 AM  Indications: pain  Details: 22 G needle, anterolateral approach  Outcome: tolerated well, no immediate complications    I explained to the patient the risks and benefits of a steroid injection into the patient's knee. These include pain at the site of the injection, local swelling, irritation from the injection, local discoloration of the skin, mild atrophy of the subcutaneous fat locally, possible irritation of the knee joint as a result of a reaction to the medications injected, risk  of bleeding, and a risk of knee infection. It was explained to the patient that if they did have a flareup of pain in the evening following the injection that they should ice the knee 15 minutes at a time 3 times a day for up to 3 days and if the pain gets too significant or if they have any significant pain with range of motion that they did not experience pre-injection that they should go to a local Emergency Room right away.     After understanding the risks and benefits, the patient decided to proceed with injection. The knee was prepped sterilely with Betadine and 2 mL of 2% Lidocaine and 1 mL of triamcinolone 40 mg was injected into the knee via the anterolateral portal site without complications. A bandage was applied at the site of the injection. The patient tolerated the procedure well.  Procedure, treatment alternatives, risks and benefits explained, specific risks discussed. Consent was given by the patient.       Assessment/Plan   Problem List Items Addressed This Visit       Primary osteoarthritis of left knee - Primary     Left knee cortisone injection provided today, see procedure note above  We can perform these no sooner than every 3 months  12 page document of home knee PT exercises provided         Relevant Medications    triamcinolone acetonide (Kenalog-40) injection 40 mg (Completed)    Other Relevant Orders    Joint Injection Large/Arthrocentesis: L knee (Completed)    Flu vaccine need    Relevant Orders    Flu vaccine, quadrivalent, high-dose, preservative free, age 65y+ (FLUZONE) (Completed)     Current Outpatient Medications   Medication Instructions    atorvastatin (LIPITOR) 20 mg, oral, Daily    erythromycin (Romycin) 5 mg/gram (0.5 %) ophthalmic ointment Both Eyes, Nightly    latanoprost (Xalatan) 0.005 % ophthalmic solution ophthalmic (eye)    levothyroxine (SYNTHROID, LEVOXYL) 100 mcg, oral, Daily    meclizine (ANTIVERT) 25 mg, oral, 3 times daily PRN    olmesartan (BENICAR) 40 mg,  oral, Daily    pantoprazole (PROTONIX) 40 mg, oral, Daily before breakfast    Restasis 0.05 % ophthalmic emulsion 1 drop, Both Eyes, Every 12 hours    rosuvastatin (CRESTOR) 40 mg, oral, Daily

## 2023-09-11 NOTE — ASSESSMENT & PLAN NOTE
Left knee cortisone injection provided today, see procedure note above  We can perform these no sooner than every 3 months  12 page document of home knee PT exercises provided

## 2023-10-30 ENCOUNTER — TELEPHONE (OUTPATIENT)
Dept: PRIMARY CARE | Facility: CLINIC | Age: 63
End: 2023-10-30
Payer: COMMERCIAL

## 2023-10-30 DIAGNOSIS — I10 BENIGN ESSENTIAL HYPERTENSION: Primary | ICD-10-CM

## 2023-10-30 RX ORDER — AMLODIPINE BESYLATE 5 MG/1
5 TABLET ORAL DAILY
Qty: 90 TABLET | Refills: 1 | Status: SHIPPED | OUTPATIENT
Start: 2023-10-30 | End: 2023-11-27 | Stop reason: SINTOL

## 2023-11-28 DIAGNOSIS — I10 BENIGN ESSENTIAL HYPERTENSION: ICD-10-CM

## 2023-11-28 RX ORDER — HYDROCHLOROTHIAZIDE 12.5 MG/1
12.5 TABLET ORAL DAILY
Qty: 30 TABLET | Refills: 2 | Status: SHIPPED | OUTPATIENT
Start: 2023-11-28 | End: 2024-02-14 | Stop reason: SDUPTHER

## 2024-01-08 ENCOUNTER — ANCILLARY PROCEDURE (OUTPATIENT)
Dept: RADIOLOGY | Facility: CLINIC | Age: 64
End: 2024-01-08
Payer: COMMERCIAL

## 2024-01-08 DIAGNOSIS — Z12.31 ENCOUNTER FOR SCREENING MAMMOGRAM FOR MALIGNANT NEOPLASM OF BREAST: ICD-10-CM

## 2024-01-08 PROCEDURE — 77067 SCR MAMMO BI INCL CAD: CPT | Mod: BILATERAL PROCEDURE | Performed by: RADIOLOGY

## 2024-01-08 PROCEDURE — 77067 SCR MAMMO BI INCL CAD: CPT

## 2024-01-08 PROCEDURE — 77063 BREAST TOMOSYNTHESIS BI: CPT | Mod: BILATERAL PROCEDURE | Performed by: RADIOLOGY

## 2024-01-15 ENCOUNTER — TELEMEDICINE (OUTPATIENT)
Dept: PRIMARY CARE | Facility: CLINIC | Age: 64
End: 2024-01-15
Payer: COMMERCIAL

## 2024-01-15 DIAGNOSIS — R39.9 UTI SYMPTOMS: Primary | ICD-10-CM

## 2024-01-15 DIAGNOSIS — Z17.1 MALIGNANT NEOPLASM OF UPPER-INNER QUADRANT OF RIGHT BREAST IN FEMALE, ESTROGEN RECEPTOR NEGATIVE (MULTI): ICD-10-CM

## 2024-01-15 DIAGNOSIS — C50.211 MALIGNANT NEOPLASM OF UPPER-INNER QUADRANT OF RIGHT BREAST IN FEMALE, ESTROGEN RECEPTOR NEGATIVE (MULTI): ICD-10-CM

## 2024-01-15 PROCEDURE — 1123F ACP DISCUSS/DSCN MKR DOCD: CPT | Performed by: FAMILY MEDICINE

## 2024-01-15 PROCEDURE — 1159F MED LIST DOCD IN RCRD: CPT | Performed by: FAMILY MEDICINE

## 2024-01-15 PROCEDURE — 1158F ADVNC CARE PLAN TLK DOCD: CPT | Performed by: FAMILY MEDICINE

## 2024-01-15 PROCEDURE — 1160F RVW MEDS BY RX/DR IN RCRD: CPT | Performed by: FAMILY MEDICINE

## 2024-01-15 PROCEDURE — 99213 OFFICE O/P EST LOW 20 MIN: CPT | Performed by: FAMILY MEDICINE

## 2024-01-15 RX ORDER — SULFAMETHOXAZOLE AND TRIMETHOPRIM 800; 160 MG/1; MG/1
1 TABLET ORAL 2 TIMES DAILY
Qty: 10 TABLET | Refills: 0 | Status: SHIPPED | OUTPATIENT
Start: 2024-01-15 | End: 2024-01-20

## 2024-01-15 ASSESSMENT — ENCOUNTER SYMPTOMS: DYSURIA: 1

## 2024-01-15 NOTE — PROGRESS NOTES
Subjective   Patient ID: Italia Pierce is a 63 y.o. female who presents for UTI.    Past Medical, Surgical, and Family History reviewed and updated in chart.    Reviewed all medications by prescribing practitioner or clinical pharmacist (such as prescriptions, OTCs, herbal therapies and supplements) and documented in the medical record.    Difficulty Urinating     Italia has reported experiencing painful urination and bladder pressure, symptoms that began approximately four days ago. She opted to self-medicate with some leftover Bactrim she had in her cabinet, taking a full two-day course.     Currently, she is seeking a full course prescription, as her symptoms have begun to subside. It's important to note that she denies experiencing any fever, abdominal pain, nausea/vomiting/diarrhea, or hematuria.    Continues to follow with her gynecologist regarding annual mammogram screenings, last done January 8, 2024, continued no evidence of malignancy.    Review of Systems   Genitourinary:  Positive for dysuria.     All pertinent positive symptoms are included in the history of present illness.    All other systems have been reviewed and are negative and noncontributory to this patient's current ailments.    Past Medical History:   Diagnosis Date    Essential (primary) hypertension 10/02/2013    Benign essential hypertension    Malignant neoplasm of unspecified site of unspecified female breast (CMS/HCC) 01/24/2019    Breast cancer    Personal history of other diseases of the nervous system and sense organs     History of glaucoma    Personal history of other endocrine, nutritional and metabolic disease     History of thyroid disease     Past Surgical History:   Procedure Laterality Date    BREAST BIOPSY Right 2020    benign    BREAST LUMPECTOMY Right 06/16/2014    Right Breast Lumpectomy    OTHER SURGICAL HISTORY  10/18/2013    Liposuction    OTHER SURGICAL HISTORY  10/18/2013    Otoplasty    OTHER SURGICAL HISTORY   02/10/2020    [place] lymph node biopsy procedure    REFRACTIVE SURGERY  10/18/2013    Corneal LASIK    TONSILLECTOMY  10/18/2013    Tonsillectomy     Social History     Tobacco Use    Smoking status: Every Day     Packs/day: 0.50     Years: 40.00     Additional pack years: 0.00     Total pack years: 20.00     Types: Cigarettes    Smokeless tobacco: Never   Substance Use Topics    Alcohol use: Yes    Drug use: Never     Family History   Problem Relation Name Age of Onset    Macular degeneration Mother      Cancer Maternal Grandmother      Diabetes Maternal Grandmother      Cancer Maternal Grandfather       Immunization History   Administered Date(s) Administered    Flu vaccine (IIV4), preservative free *Check age/dose* 12/20/2017, 10/15/2020, 09/27/2021    Flu vaccine, quadrivalent, high-dose, preservative free, age 65y+ (FLUZONE) 09/11/2023    Influenza, seasonal, injectable 10/06/2009, 11/01/2010, 11/05/2013, 11/01/2022    Pfizer Purple Cap SARS-CoV-2 03/17/2021, 04/16/2021, 11/02/2021    Pneumococcal polysaccharide vaccine, 23-valent, age 2 years and older (PNEUMOVAX 23) 02/05/2020    Zoster, live 11/08/2022     Current Outpatient Medications   Medication Instructions    atorvastatin (LIPITOR) 20 mg, oral, Daily    erythromycin (Romycin) 5 mg/gram (0.5 %) ophthalmic ointment Both Eyes, Nightly    hydroCHLOROthiazide (HYDRODIURIL) 12.5 mg, oral, Daily    latanoprost (Xalatan) 0.005 % ophthalmic solution ophthalmic (eye)    levothyroxine (SYNTHROID, LEVOXYL) 100 mcg, oral, Daily    meclizine (ANTIVERT) 25 mg, oral, 3 times daily PRN    olmesartan (BENICAR) 40 mg, oral, Daily    pantoprazole (PROTONIX) 40 mg, oral, Daily before breakfast    Restasis 0.05 % ophthalmic emulsion 1 drop, Both Eyes, Every 12 hours    rosuvastatin (CRESTOR) 40 mg, oral, Daily    sulfamethoxazole-trimethoprim (Bactrim DS) 800-160 mg tablet 1 tablet, oral, 2 times daily     Allergies   Allergen Reactions    Gadolinium-Containing Contrast  Media Nausea Only, Other and Rash    Prochlorperazine Swelling       Objective   There were no vitals filed for this visit.  There is no height or weight on file to calculate BMI.    BP Readings from Last 3 Encounters:   09/11/23 126/80   09/05/23 123/70   08/01/23 110/64      Wt Readings from Last 3 Encounters:   09/11/23 86.6 kg (191 lb)   09/05/23 86.6 kg (191 lb)   08/01/23 86.2 kg (190 lb)        No visits with results within 1 Month(s) from this visit.   Latest known visit with results is:   Erroneous Encounter on 10/02/2023   Component Date Value    NONINV COLON CA DNA+OCC * 10/17/2023 Negative      Physical Exam  CONSTITUTIONAL - well nourished, well developed, looks like stated age, in no acute distress, not ill-appearing, and not tired appearing  SKIN - normal skin color and pigmentation, normal skin turgor without rash, lesions, or nodules visualized  HEAD - no trauma, normocephalic  EYES - normal external exam  CHEST -no distressed breathing, good effort  EXTREMITIES - no edema, no deformities  NEUROLOGICAL - normal balance, normal motor, no ataxia  PSYCHIATRIC - alert, pleasant and cordial, age-appropriate     Assessment/Plan   Problem List Items Addressed This Visit       Breast cancer (CMS/[facility])     Last mammogram reviewed, January 8, no malignancy         UTI symptoms - Primary     As you have already taken Bactrim for two full days, I have sent in a prescription for an additional five days to your pharmacy. This should allow you to complete a full course of treatment. However, if your symptoms persist after this period, please do not hesitate to get in touch for a follow-up.         Relevant Medications    sulfamethoxazole-trimethoprim (Bactrim DS) 800-160 mg tablet

## 2024-01-15 NOTE — ASSESSMENT & PLAN NOTE
As you have already taken Bactrim for two full days, I have sent in a prescription for an additional five days to your pharmacy. This should allow you to complete a full course of treatment. However, if your symptoms persist after this period, please do not hesitate to get in touch for a follow-up.   Statement Selected

## 2024-02-13 ENCOUNTER — APPOINTMENT (OUTPATIENT)
Dept: PRIMARY CARE | Facility: CLINIC | Age: 64
End: 2024-02-13
Payer: COMMERCIAL

## 2024-02-14 ENCOUNTER — OFFICE VISIT (OUTPATIENT)
Dept: PRIMARY CARE | Facility: CLINIC | Age: 64
End: 2024-02-14
Payer: COMMERCIAL

## 2024-02-14 VITALS
DIASTOLIC BLOOD PRESSURE: 80 MMHG | HEART RATE: 65 BPM | SYSTOLIC BLOOD PRESSURE: 132 MMHG | BODY MASS INDEX: 32.24 KG/M2 | WEIGHT: 182 LBS

## 2024-02-14 DIAGNOSIS — I44.7 LBBB (LEFT BUNDLE BRANCH BLOCK): ICD-10-CM

## 2024-02-14 DIAGNOSIS — M17.12 PRIMARY OSTEOARTHRITIS OF LEFT KNEE: ICD-10-CM

## 2024-02-14 DIAGNOSIS — I10 BENIGN ESSENTIAL HYPERTENSION: ICD-10-CM

## 2024-02-14 DIAGNOSIS — R00.0 TACHYCARDIA: Primary | ICD-10-CM

## 2024-02-14 PROBLEM — Z23 FLU VACCINE NEED: Status: RESOLVED | Noted: 2023-09-11 | Resolved: 2024-02-14

## 2024-02-14 PROBLEM — R39.9 UTI SYMPTOMS: Status: RESOLVED | Noted: 2024-01-15 | Resolved: 2024-02-14

## 2024-02-14 PROBLEM — D48.5 NEOPLASM OF UNCERTAIN BEHAVIOR OF SKIN: Status: RESOLVED | Noted: 2017-10-18 | Resolved: 2024-02-14

## 2024-02-14 PROCEDURE — 3075F SYST BP GE 130 - 139MM HG: CPT | Performed by: FAMILY MEDICINE

## 2024-02-14 PROCEDURE — 1123F ACP DISCUSS/DSCN MKR DOCD: CPT | Performed by: FAMILY MEDICINE

## 2024-02-14 PROCEDURE — 93000 ELECTROCARDIOGRAM COMPLETE: CPT | Performed by: FAMILY MEDICINE

## 2024-02-14 PROCEDURE — 4004F PT TOBACCO SCREEN RCVD TLK: CPT | Performed by: FAMILY MEDICINE

## 2024-02-14 PROCEDURE — 99214 OFFICE O/P EST MOD 30 MIN: CPT | Performed by: FAMILY MEDICINE

## 2024-02-14 PROCEDURE — 3079F DIAST BP 80-89 MM HG: CPT | Performed by: FAMILY MEDICINE

## 2024-02-14 PROCEDURE — 1159F MED LIST DOCD IN RCRD: CPT | Performed by: FAMILY MEDICINE

## 2024-02-14 PROCEDURE — 1160F RVW MEDS BY RX/DR IN RCRD: CPT | Performed by: FAMILY MEDICINE

## 2024-02-14 PROCEDURE — 20610 DRAIN/INJ JOINT/BURSA W/O US: CPT | Performed by: FAMILY MEDICINE

## 2024-02-14 RX ORDER — HYDROCHLOROTHIAZIDE 12.5 MG/1
12.5 TABLET ORAL DAILY
Qty: 90 TABLET | Refills: 1 | Status: SHIPPED | OUTPATIENT
Start: 2024-02-14 | End: 2024-06-11 | Stop reason: SDUPTHER

## 2024-02-14 RX ORDER — TRIAMCINOLONE ACETONIDE 40 MG/ML
40 INJECTION, SUSPENSION INTRA-ARTICULAR; INTRAMUSCULAR ONCE
Status: COMPLETED | OUTPATIENT
Start: 2024-02-14 | End: 2024-02-14

## 2024-02-14 RX ADMIN — TRIAMCINOLONE ACETONIDE 40 MG: 40 INJECTION, SUSPENSION INTRA-ARTICULAR; INTRAMUSCULAR at 11:01

## 2024-02-14 NOTE — PROGRESS NOTES
Subjective   Patient ID: Italia Pierce is a 63 y.o. female who presents for Palpitations (For a few weeks, seems to be better now).    Past Medical, Surgical, and Family History reviewed and updated in chart.    Reviewed all medications by prescribing practitioner or clinical pharmacist (such as prescriptions, OTCs, herbal therapies and supplements) and documented in the medical record.    HPI  1.  Racing heartbeat.    Approximately two weeks ago, Italia began experiencing episodes of heart racing. She reports that the rhythm felt regular. Despite reducing her caffeine intake, the issue persists. Italia is a smoker and does not use decongestants. She is unsure of any other potential triggers for these episodes. It is noteworthy to mention that her mother has a history of atrial fibrillation, though Italia has never been diagnosed with this condition.    Italia denies experiencing any associated symptoms such as chest pain, shortness of breath, syncope, or other cardiac or respiratory concerns during these episodes. Approximately a week ago, she visited her dentist for a tooth implant procedure. During the visit, she experienced heart racing and felt extremely nervous. Her blood pressure was recorded as 180/100. Prior to this episode, Italia had discontinued her daily 12.5mg dosage of hydrochlorothiazide due to frequent urination, a side effect she found bothersome.    However, due to the persistently high blood pressure, she resumed her hydrochlorothiazide medication about a week ago. Since then, her blood pressure has returned to normal ranges. Italia has expressed her preference to continue with the current medication, despite the side effect of frequent urination, and is not interested in exploring other medication options at this time.    2.  Left knee arthritis.  She had a cortisone injection about 5 months ago, and it seemed to help very well, so she is requesting another 1 today.    Review of systems  All  pertinent positive symptoms are included in the history of present illness.    All other systems have been reviewed and are negative and noncontributory to this patient's current ailments.    Past Medical History:   Diagnosis Date    Essential (primary) hypertension 10/02/2013    Benign essential hypertension    Malignant neoplasm of unspecified site of unspecified female breast (CMS/HCC) 01/24/2019    Breast cancer    Personal history of other diseases of the nervous system and sense organs     History of glaucoma    Personal history of other endocrine, nutritional and metabolic disease     History of thyroid disease     Past Surgical History:   Procedure Laterality Date    BREAST BIOPSY Right 2020    benign    BREAST LUMPECTOMY Right 06/16/2014    Right Breast Lumpectomy    OTHER SURGICAL HISTORY  10/18/2013    Liposuction    OTHER SURGICAL HISTORY  10/18/2013    Otoplasty    OTHER SURGICAL HISTORY  02/10/2020    [place] lymph node biopsy procedure    REFRACTIVE SURGERY  10/18/2013    Corneal LASIK    TONSILLECTOMY  10/18/2013    Tonsillectomy     Social History     Tobacco Use    Smoking status: Every Day     Packs/day: 0.50     Years: 40.00     Additional pack years: 0.00     Total pack years: 20.00     Types: Cigarettes    Smokeless tobacco: Never   Substance Use Topics    Alcohol use: Yes    Drug use: Never     Family History   Problem Relation Name Age of Onset    Macular degeneration Mother      Cancer Maternal Grandmother      Diabetes Maternal Grandmother      Cancer Maternal Grandfather       Immunization History   Administered Date(s) Administered    Flu vaccine (IIV4), preservative free *Check age/dose* 12/20/2017, 10/15/2020, 09/27/2021    Flu vaccine, quadrivalent, high-dose, preservative free, age 65y+ (FLUZONE) 09/11/2023    Influenza, seasonal, injectable 10/06/2009, 11/01/2010, 11/05/2013, 11/01/2022    Pfizer Purple Cap SARS-CoV-2 03/17/2021, 04/16/2021, 11/02/2021    Pneumococcal polysaccharide  vaccine, 23-valent, age 2 years and older (PNEUMOVAX 23) 02/05/2020    Zoster, live 11/08/2022     Current Outpatient Medications   Medication Instructions    atorvastatin (LIPITOR) 20 mg, oral, Daily    erythromycin (Romycin) 5 mg/gram (0.5 %) ophthalmic ointment Both Eyes, Nightly    hydroCHLOROthiazide (HYDRODIURIL) 12.5 mg, oral, Daily    latanoprost (Xalatan) 0.005 % ophthalmic solution ophthalmic (eye)    levothyroxine (SYNTHROID, LEVOXYL) 100 mcg, oral, Daily    meclizine (ANTIVERT) 25 mg, oral, 3 times daily PRN    olmesartan (BENICAR) 40 mg, oral, Daily    pantoprazole (PROTONIX) 40 mg, oral, Daily before breakfast    Restasis 0.05 % ophthalmic emulsion 1 drop, Both Eyes, Every 12 hours    rosuvastatin (CRESTOR) 40 mg, oral, Daily     Allergies   Allergen Reactions    Gadolinium-Containing Contrast Media Nausea Only, Other and Rash    Prochlorperazine Swelling       Objective   Vitals:    02/14/24 1008   BP: 132/80   Pulse: 65   Weight: 82.6 kg (182 lb)     Body mass index is 32.24 kg/m².    BP Readings from Last 3 Encounters:   02/14/24 132/80   09/11/23 126/80   09/05/23 123/70      Wt Readings from Last 3 Encounters:   02/14/24 82.6 kg (182 lb)   09/11/23 86.6 kg (191 lb)   09/05/23 86.6 kg (191 lb)        No visits with results within 1 Month(s) from this visit.   Latest known visit with results is:   Erroneous Encounter on 10/02/2023   Component Date Value    NONINV COLON CA DNA+OCC * 10/17/2023 Negative      Physical Exam  CONSTITUTIONAL - well nourished, well developed, looks like stated age, in no acute distress, not ill-appearing, and not tired appearing  SKIN - normal skin color and pigmentation, normal skin turgor without rash, lesions, or nodules visualized  HEAD - no trauma, normocephalic  EYES - pupils are equal and reactive to light, extraocular muscles are intact, and normal external exam  CHEST - clear to auscultation, no wheezing, no crackles and no rales, good effort  CARDIAC - regular  "rate and regular rhythm, extra beats every 10 seconds or so, no murmur  EXTREMITIES - no obvious or evident edema, no obvious or evident deformities although left joint space is very narrow  NEUROLOGICAL - normal gait, normal balance, normal motor, no ataxia, alert, oriented and no focal signs  PSYCHIATRIC - alert, pleasant and cordial, age-appropriate    Joint Injection Large/Arthrocentesis: L knee on 2/14/2024 10:40 AM  Indications: pain  Details: 22 G needle, lateral approach  Procedure, treatment alternatives, risks and benefits explained, specific risks discussed. Consent was given by the patient. Immediately prior to procedure a time out was called to verify the correct patient, procedure, equipment, support staff and site/side marked as required. Patient was prepped and draped in the usual sterile fashion.       Assessment/Plan   Problem List Items Addressed This Visit       LBBB (left bundle branch block)     EKG done reveals sinus rhythm with LBBB, unchanged since previous         Primary osteoarthritis of left knee     Cortisone injection was provided in your left knee today hoping for both acute and long-term relief    Indications, potential risks, complications and side effects, alternatives, and potential outcomes for the injection procedure was discussed with the patient: joint was prepped in a sterile fashion, 22 gauge with 1.5\" length needle was used for injection.    Please return to the clinic if pain, swelling, or signs of infection occur. It will be necessary to further evaluate you at that time, along with considering the possibility of an orthopedic consultation to provide further medical management         Relevant Medications    triamcinolone acetonide (Kenalog-40) injection 40 mg (Completed)    Other Relevant Orders    Joint Injection Large/Arthrocentesis: L knee    Tachycardia - Primary     Although I do not believe that this is atrial fibrillation, based on your racing heart rate, I would " recommend a cardiac event monitor.  I will send a requisition to the cardiology office in [place] indicating that you will be there in 7 days to have this placed after which we can review and determine whether or not further intervention is warranted    We briefly talked about discontinuation of hydrochlorothiazide in favor of a beta-blocker, but you have declined that option, which is reasonable    If you start to develop any worsening of your symptoms, in particular, cardiac or respiratory symptoms such as increasing shortness of breath, chest tightness, chest pain, difficulty breathing, or wheezing, please get to the emergency department immediately for further evaluation and medical management         Relevant Orders    ECG 12 lead (Clinic Performed) (Completed)    Holter or Event Cardiac Monitor

## 2024-02-14 NOTE — ASSESSMENT & PLAN NOTE
"Cortisone injection was provided in your left knee today hoping for both acute and long-term relief    Indications, potential risks, complications and side effects, alternatives, and potential outcomes for the injection procedure was discussed with the patient: joint was prepped in a sterile fashion, 22 gauge with 1.5\" length needle was used for injection.    Please return to the clinic if pain, swelling, or signs of infection occur. It will be necessary to further evaluate you at that time, along with considering the possibility of an orthopedic consultation to provide further medical management  "

## 2024-02-20 DIAGNOSIS — E78.2 MIXED HYPERLIPIDEMIA: ICD-10-CM

## 2024-02-20 DIAGNOSIS — E03.9 HYPOTHYROIDISM, UNSPECIFIED: ICD-10-CM

## 2024-02-21 ENCOUNTER — APPOINTMENT (OUTPATIENT)
Dept: CARDIOLOGY | Facility: CLINIC | Age: 64
End: 2024-02-21
Payer: COMMERCIAL

## 2024-02-21 RX ORDER — ROSUVASTATIN CALCIUM 40 MG/1
40 TABLET, COATED ORAL DAILY
Qty: 90 TABLET | Refills: 0 | Status: SHIPPED | OUTPATIENT
Start: 2024-02-21 | End: 2024-06-03

## 2024-02-21 RX ORDER — LEVOTHYROXINE SODIUM 100 UG/1
100 TABLET ORAL DAILY
Qty: 90 TABLET | Refills: 0 | Status: SHIPPED | OUTPATIENT
Start: 2024-02-21 | End: 2024-06-03 | Stop reason: SDUPTHER

## 2024-02-24 DIAGNOSIS — I10 BENIGN ESSENTIAL HYPERTENSION: ICD-10-CM

## 2024-02-26 ENCOUNTER — APPOINTMENT (OUTPATIENT)
Dept: CARDIOLOGY | Facility: CLINIC | Age: 64
End: 2024-02-26
Payer: COMMERCIAL

## 2024-02-26 RX ORDER — OLMESARTAN MEDOXOMIL 40 MG/1
40 TABLET ORAL DAILY
Qty: 90 TABLET | Refills: 1 | Status: SHIPPED | OUTPATIENT
Start: 2024-02-26 | End: 2024-04-02 | Stop reason: SDUPTHER

## 2024-03-07 ENCOUNTER — HOSPITAL ENCOUNTER (OUTPATIENT)
Dept: CARDIOLOGY | Facility: CLINIC | Age: 64
Discharge: HOME | End: 2024-03-07
Payer: COMMERCIAL

## 2024-03-07 DIAGNOSIS — R00.0 TACHYCARDIA: ICD-10-CM

## 2024-03-07 PROCEDURE — 93248 EXT ECG>7D<15D REV&INTERPJ: CPT | Performed by: INTERNAL MEDICINE

## 2024-03-07 PROCEDURE — 93246 EXT ECG>7D<15D RECORDING: CPT

## 2024-03-28 ENCOUNTER — TELEPHONE (OUTPATIENT)
Dept: CARDIOLOGY | Facility: CLINIC | Age: 64
End: 2024-03-28
Payer: COMMERCIAL

## 2024-03-28 DIAGNOSIS — I49.9 CARDIAC ARRHYTHMIA, UNSPECIFIED CARDIAC ARRHYTHMIA TYPE: Primary | ICD-10-CM

## 2024-03-29 NOTE — RESULT ENCOUNTER NOTE
According to the cardiac event monitor, the findings suggest that you may have some cardiac rhythm abnormalities. Supraventricular Tachycardia (SVT) and Non-Sustained Ventricular Tachycardia (NSVT) can be symptoms of underlying heart conditions. Intraventricular Conduction Delay (IVCD) could also indicate heart disease.    I think it would be time to see a cardiologist to further evaluate to determine whether or not further diagnostic tests such as an echocardiogram, stress test, cardiac MRI, or electrophysiology study might be needed.    I sent a referral in for you to be seen by Dr. Zhang in [place]

## 2024-04-02 ENCOUNTER — OFFICE VISIT (OUTPATIENT)
Dept: CARDIOLOGY | Facility: CLINIC | Age: 64
End: 2024-04-02
Payer: COMMERCIAL

## 2024-04-02 VITALS
HEART RATE: 85 BPM | DIASTOLIC BLOOD PRESSURE: 74 MMHG | BODY MASS INDEX: 31.69 KG/M2 | SYSTOLIC BLOOD PRESSURE: 128 MMHG | HEIGHT: 64 IN | WEIGHT: 185.6 LBS

## 2024-04-02 DIAGNOSIS — I44.7 LBBB (LEFT BUNDLE BRANCH BLOCK): ICD-10-CM

## 2024-04-02 DIAGNOSIS — I10 BENIGN ESSENTIAL HYPERTENSION: ICD-10-CM

## 2024-04-02 DIAGNOSIS — I49.9 CARDIAC ARRHYTHMIA, UNSPECIFIED CARDIAC ARRHYTHMIA TYPE: ICD-10-CM

## 2024-04-02 DIAGNOSIS — I47.10 PAROXYSMAL SUPRAVENTRICULAR TACHYCARDIA (CMS-HCC): Primary | ICD-10-CM

## 2024-04-02 DIAGNOSIS — I47.29 NSVT (NONSUSTAINED VENTRICULAR TACHYCARDIA) (MULTI): ICD-10-CM

## 2024-04-02 DIAGNOSIS — R00.2 PALPITATIONS: ICD-10-CM

## 2024-04-02 PROCEDURE — 3078F DIAST BP <80 MM HG: CPT | Performed by: INTERNAL MEDICINE

## 2024-04-02 PROCEDURE — 4004F PT TOBACCO SCREEN RCVD TLK: CPT | Performed by: INTERNAL MEDICINE

## 2024-04-02 PROCEDURE — 99204 OFFICE O/P NEW MOD 45 MIN: CPT | Performed by: INTERNAL MEDICINE

## 2024-04-02 PROCEDURE — 99214 OFFICE O/P EST MOD 30 MIN: CPT | Performed by: INTERNAL MEDICINE

## 2024-04-02 PROCEDURE — 1160F RVW MEDS BY RX/DR IN RCRD: CPT | Performed by: INTERNAL MEDICINE

## 2024-04-02 PROCEDURE — 1159F MED LIST DOCD IN RCRD: CPT | Performed by: INTERNAL MEDICINE

## 2024-04-02 PROCEDURE — 3074F SYST BP LT 130 MM HG: CPT | Performed by: INTERNAL MEDICINE

## 2024-04-02 RX ORDER — METOPROLOL SUCCINATE 25 MG/1
25 TABLET, EXTENDED RELEASE ORAL DAILY
Qty: 30 TABLET | Refills: 11 | Status: SHIPPED | OUTPATIENT
Start: 2024-04-02 | End: 2024-04-09 | Stop reason: SINTOL

## 2024-04-02 RX ORDER — OLMESARTAN MEDOXOMIL 40 MG/1
20 TABLET ORAL DAILY
Qty: 45 TABLET | Refills: 1 | Status: SHIPPED | OUTPATIENT
Start: 2024-04-02 | End: 2024-04-17 | Stop reason: SDUPTHER

## 2024-04-02 NOTE — PROGRESS NOTES
Chief Complaint:   Palpitations     History of Present Illness     Italia Pierce is a 63 y.o. female presenting with for evaluation of palpitations.  For the past 2 months, Italia Pierce has experienced acute moderate palpitations described as racing which occur two times per day and last seconds.  The palpitations do not radiate.  This has been stable/progressive.  A Holter demonstrated the presence of PSVT and NSVT.  Palpitations are worsened by none and improved by stopping and resting and takes deep breaths.  The patient drinks 1 cup coffee caffeine.  =  The patient does not use any stimulants but does smoke.   There is no associated chest discomfort, shortness of breath, syncope, or near-syncope.  She also has vertigo with head movement.  There is no history of coronary artery disease, heart failure, myocardial infarction, left ventricular dysfunction.  Mother (89) has atrial fibrillation.  There is no family history of primary electrical cardiac disorders or sudden cardiac death.  An ECG does not demonstrate evidence of long QT syndrome, Brugada syndrome, or Migel-Parkinson-White syndrome.    Review of Systems  All pertinent systems have been reviewed and are negative except for what is stated in the history of present illness.    All other systems have been reviewed and are negative and noncontributory to this patient's current ailments.  .       Previous History     Past Medical History:  She has a past medical history of Essential (primary) hypertension (10/02/2013), Malignant neoplasm of unspecified site of unspecified female breast (CMS/HCC) (01/24/2019), NSVT (nonsustained ventricular tachycardia) (CMS/[facility]) (04/02/2024), Palpitations (04/02/2024), Paroxysmal supraventricular tachycardia (CMS/[facility]) (04/02/2024), Personal history of other diseases of the nervous system and sense organs, and Personal history of other endocrine, nutritional and metabolic disease.    Past Surgical History:  She has a past  "surgical history that includes Other surgical history (10/18/2013); Other surgical history (10/18/2013); Tonsillectomy (10/18/2013); Refractive surgery (10/18/2013); Other surgical history (02/10/2020); Breast lumpectomy (Right, 06/16/2014); and Breast biopsy (Right, 2020).      Social History:  She reports that she has been smoking cigarettes. She has a 20.00 pack-year smoking history. She has never used smokeless tobacco. She reports current alcohol use. She reports that she does not use drugs.    Family History:  Family History   Problem Relation Name Age of Onset    Macular degeneration Mother      Cancer Maternal Grandmother      Diabetes Maternal Grandmother      Cancer Maternal Grandfather          Allergies:  Gadolinium-containing contrast media and Prochlorperazine    Outpatient Medications:  Current Outpatient Medications   Medication Instructions    erythromycin (Romycin) 5 mg/gram (0.5 %) ophthalmic ointment Both Eyes, Nightly    hydroCHLOROthiazide (MICROZIDE) 12.5 mg, oral, Daily    latanoprost (Xalatan) 0.005 % ophthalmic solution ophthalmic (eye)    levothyroxine (SYNTHROID, LEVOXYL) 100 mcg, oral, Daily    meclizine (ANTIVERT) 25 mg, oral, 3 times daily PRN    olmesartan (BENICAR) 40 mg, oral, Daily    pantoprazole (PROTONIX) 40 mg, oral, Daily before breakfast    Restasis 0.05 % ophthalmic emulsion 1 drop, Both Eyes, Every 12 hours    rosuvastatin (CRESTOR) 40 mg, oral, Daily       Physical Examination   Vitals:  Visit Vitals  /74 (BP Location: Right arm, Patient Position: Sitting, BP Cuff Size: Adult)   Pulse 85   Ht 1.626 m (5' 4\")   Wt 84.2 kg (185 lb 9.6 oz)   BMI 31.86 kg/m²   OB Status Postmenopausal   Smoking Status Every Day   BSA 1.95 m²    Physical Exam  Vitals reviewed.   Constitutional:       General: She is not in acute distress.     Appearance: Normal appearance.   HENT:      Head: Normocephalic and atraumatic.      Nose: Nose normal.   Eyes:      Conjunctiva/sclera: " "Conjunctivae normal.   Cardiovascular:      Rate and Rhythm: Normal rate and regular rhythm.      Pulses: Normal pulses.      Heart sounds: No murmur heard.  Pulmonary:      Effort: Pulmonary effort is normal. No respiratory distress.      Breath sounds: Normal breath sounds. No wheezing, rhonchi or rales.   Abdominal:      General: Bowel sounds are normal. There is no distension.      Palpations: Abdomen is soft.      Tenderness: There is no abdominal tenderness.   Musculoskeletal:         General: No swelling.      Right lower leg: No edema.      Left lower leg: No edema.   Skin:     General: Skin is warm and dry.      Capillary Refill: Capillary refill takes less than 2 seconds.   Neurological:      General: No focal deficit present.      Mental Status: She is alert.   Psychiatric:         Mood and Affect: Mood normal.              Labs/Imaging/Cardiac Studies     Last Labs:  CBC -  Lab Results   Component Value Date    WBC 6.1 03/04/2022    HGB 13.6 03/04/2022    HCT 39.3 03/04/2022    MCV 98 03/04/2022     03/04/2022       CMP -  Lab Results   Component Value Date    CALCIUM 10.1 08/09/2023    PROT 7.1 08/09/2023    ALBUMIN 4.6 08/09/2023    AST 22 08/09/2023    ALT 26 08/09/2023    ALKPHOS 61 08/09/2023    BILITOT 0.6 08/09/2023       LIPID PANEL -   Lab Results   Component Value Date    CHOL 209 (H) 08/09/2023    HDL 93.6 08/09/2023    CHHDL 2.2 08/09/2023    VLDL 23 08/09/2023    TRIG 117 08/09/2023    NHDL 155 10/22/2019       RENAL FUNCTION PANEL -   Lab Results   Component Value Date    K 3.7 08/09/2023       No results found for: \"BNP\", \"HGBA1C\"    ECG:    Echo:  No echocardiogram results found for the past 12 months       Assessment and Recommendations     Assessment/Plan   1. Paroxysmal supraventricular tachycardia (CMS/HCC)  Start metoprolol XL and check Stress echo.    2. Palpitations  Due to PSVT.  Start beta blocker.    4. NSVT (nonsustained ventricular tachycardia) (CMS/HCC)  Start beta " blocker and check stress echo.    5. LBBB (left bundle branch block)  ASX.  She has vertigo.    6. Benign essential hypertension  Well controlled.  Decrease ARB.         Curtis Zhang MD    Exclusive of any other services or procedures performed, I, Curtis Zhang MD , spent 30 minutes in duration for this visit today.  This time consisted of chart review, obtaining history, and/or performing the exam as documented above as well as documenting the clinical information for the encounter in the electronic record, discussing treatment options, plans, and/or goals with patient, family, and/or caregiver, refilling medications, updating the electronic record, ordering medicines, lab work, imaging, referrals, and/or procedures as documented above and communicating with other [facility] professionals. I have discussed the results of laboratory, radiology, and cardiology studies with the patient and their family/caregiver.

## 2024-04-08 ENCOUNTER — PATIENT MESSAGE (OUTPATIENT)
Dept: CARDIOLOGY | Facility: CLINIC | Age: 64
End: 2024-04-08
Payer: COMMERCIAL

## 2024-04-08 DIAGNOSIS — I47.29 NSVT (NONSUSTAINED VENTRICULAR TACHYCARDIA) (MULTI): Primary | ICD-10-CM

## 2024-04-08 DIAGNOSIS — I10 BENIGN ESSENTIAL HYPERTENSION: ICD-10-CM

## 2024-04-09 RX ORDER — VERAPAMIL HYDROCHLORIDE 120 MG/1
120 TABLET, FILM COATED, EXTENDED RELEASE ORAL NIGHTLY
Qty: 30 TABLET | Refills: 0 | Status: SHIPPED | OUTPATIENT
Start: 2024-04-09 | End: 2024-04-17 | Stop reason: SINTOL

## 2024-04-15 ENCOUNTER — TELEPHONE (OUTPATIENT)
Dept: CARDIOLOGY | Facility: CLINIC | Age: 64
End: 2024-04-15
Payer: COMMERCIAL

## 2024-04-15 NOTE — TELEPHONE ENCOUNTER
Pt LM stating she was put on verapamil and took it twice.  It is making her dizzy, she failed at a beta blocker because she was to drowsy. She wants to know if she can stop it and what else she can do.  She has a stress test coming up.  Please advise

## 2024-04-17 ENCOUNTER — CLINICAL SUPPORT (OUTPATIENT)
Dept: PHYSICAL THERAPY | Facility: CLINIC | Age: 64
End: 2024-04-17
Payer: COMMERCIAL

## 2024-04-17 RX ORDER — OLMESARTAN MEDOXOMIL 40 MG/1
40 TABLET ORAL DAILY
Qty: 90 TABLET | Refills: 1 | Status: SHIPPED | OUTPATIENT
Start: 2024-04-17 | End: 2024-06-06 | Stop reason: SDUPTHER

## 2024-04-17 NOTE — PROGRESS NOTES
Physical Therapy                 Therapy Communication Note    Patient Name: Italia Pierce  MRN: [id number]  Today's Date: 4/17/2024     Discipline: Physical Therapy        Missed Time: Cancel via Mychart

## 2024-04-22 ENCOUNTER — TELEPHONE (OUTPATIENT)
Dept: CARDIOLOGY | Facility: CLINIC | Age: 64
End: 2024-04-22

## 2024-04-22 ENCOUNTER — OFFICE VISIT (OUTPATIENT)
Dept: PRIMARY CARE | Facility: CLINIC | Age: 64
End: 2024-04-22
Payer: COMMERCIAL

## 2024-04-22 VITALS
BODY MASS INDEX: 30.9 KG/M2 | SYSTOLIC BLOOD PRESSURE: 126 MMHG | HEART RATE: 62 BPM | WEIGHT: 181 LBS | DIASTOLIC BLOOD PRESSURE: 72 MMHG | HEIGHT: 64 IN

## 2024-04-22 DIAGNOSIS — M17.12 PRIMARY OSTEOARTHRITIS OF LEFT KNEE: Primary | ICD-10-CM

## 2024-04-22 PROCEDURE — 1160F RVW MEDS BY RX/DR IN RCRD: CPT | Performed by: FAMILY MEDICINE

## 2024-04-22 PROCEDURE — 1159F MED LIST DOCD IN RCRD: CPT | Performed by: FAMILY MEDICINE

## 2024-04-22 PROCEDURE — 1123F ACP DISCUSS/DSCN MKR DOCD: CPT | Performed by: FAMILY MEDICINE

## 2024-04-22 PROCEDURE — 1158F ADVNC CARE PLAN TLK DOCD: CPT | Performed by: FAMILY MEDICINE

## 2024-04-22 PROCEDURE — 20610 DRAIN/INJ JOINT/BURSA W/O US: CPT | Performed by: FAMILY MEDICINE

## 2024-04-22 PROCEDURE — 4004F PT TOBACCO SCREEN RCVD TLK: CPT | Performed by: FAMILY MEDICINE

## 2024-04-22 PROCEDURE — 99213 OFFICE O/P EST LOW 20 MIN: CPT | Performed by: FAMILY MEDICINE

## 2024-04-22 PROCEDURE — 3078F DIAST BP <80 MM HG: CPT | Performed by: FAMILY MEDICINE

## 2024-04-22 PROCEDURE — 3074F SYST BP LT 130 MM HG: CPT | Performed by: FAMILY MEDICINE

## 2024-04-22 NOTE — TELEPHONE ENCOUNTER
Pt has LBBB and scheduled for Stress echo this Wednesday . Do you want it switched to a nuclear ? Please advise. Thank you

## 2024-04-22 NOTE — ASSESSMENT & PLAN NOTE
Due to the fact it was too early for corticosteroid injection, we made a shared decision to provide you with an injection of lidocaine, hoping to provide you with some comfort until you are able to receive cortisone which would be anytime after May 14    Lidocaine injection provided today, see procedure note above

## 2024-04-22 NOTE — PROGRESS NOTES
Subjective   Patient ID: Italia Pierce is a 63 y.o. female who presents for Knee Pain (Left knee).    Past Medical, Surgical, and Family History reviewed and updated in chart.    Reviewed all medications by prescribing practitioner or clinical pharmacist (such as prescriptions, OTCs, herbal therapies and supplements) and documented in the medical record.    Knee Pain     Italia is here today seeking a corticosteroid injection into her left knee  Unfortunately, she just had this done on February 14  I explained the risk of receiving consecutive corticosteroid injections within 3 months of each other and offered a lidocaine only injection which she was agreeable to    Review of Systems  All pertinent positive symptoms are included in the history of present illness.    All other systems have been reviewed and are negative and noncontributory to this patient's current ailments.    Past Medical History:   Diagnosis Date    Essential (primary) hypertension 10/02/2013    Benign essential hypertension    Malignant neoplasm of unspecified site of unspecified female breast (Multi) 01/24/2019    Breast cancer    NSVT (nonsustained ventricular tachycardia) (Multi) 04/02/2024    Palpitations 04/02/2024    Paroxysmal supraventricular tachycardia (CMS-HCC) 04/02/2024    Personal history of other diseases of the nervous system and sense organs     History of glaucoma    Personal history of other endocrine, nutritional and metabolic disease     History of thyroid disease     Past Surgical History:   Procedure Laterality Date    BREAST BIOPSY Right 2020    benign    BREAST LUMPECTOMY Right 06/16/2014    Right Breast Lumpectomy    OTHER SURGICAL HISTORY  10/18/2013    Liposuction    OTHER SURGICAL HISTORY  10/18/2013    Otoplasty    OTHER SURGICAL HISTORY  02/10/2020    [place] lymph node biopsy procedure    REFRACTIVE SURGERY  10/18/2013    Corneal LASIK    TONSILLECTOMY  10/18/2013    Tonsillectomy     Social History     Tobacco  "Use    Smoking status: Every Day     Current packs/day: 0.50     Average packs/day: 0.5 packs/day for 40.0 years (20.0 ttl pk-yrs)     Types: Cigarettes    Smokeless tobacco: Never   Substance Use Topics    Alcohol use: Yes    Drug use: Never     Family History   Problem Relation Name Age of Onset    Macular degeneration Mother      Cancer Maternal Grandmother      Diabetes Maternal Grandmother      Cancer Maternal Grandfather       Immunization History   Administered Date(s) Administered    Flu vaccine (IIV4), preservative free *Check age/dose* 12/20/2017, 10/15/2020, 09/27/2021    Flu vaccine, quadrivalent, high-dose, preservative free, age 65y+ (FLUZONE) 09/11/2023    Influenza, seasonal, injectable 10/06/2009, 11/01/2010, 11/05/2013, 11/01/2022    Pfizer Purple Cap SARS-CoV-2 03/17/2021, 04/16/2021, 11/02/2021    Pneumococcal polysaccharide vaccine, 23-valent, age 2 years and older (PNEUMOVAX 23) 02/05/2020    Zoster, live 11/08/2022     Current Outpatient Medications   Medication Instructions    erythromycin (Romycin) 5 mg/gram (0.5 %) ophthalmic ointment Both Eyes, Nightly    hydroCHLOROthiazide (MICROZIDE) 12.5 mg, oral, Daily    latanoprost (Xalatan) 0.005 % ophthalmic solution ophthalmic (eye)    levothyroxine (SYNTHROID, LEVOXYL) 100 mcg, oral, Daily    meclizine (ANTIVERT) 25 mg, oral, 3 times daily PRN    olmesartan (BENICAR) 40 mg, oral, Daily    pantoprazole (PROTONIX) 40 mg, oral, Daily before breakfast    Restasis 0.05 % ophthalmic emulsion 1 drop, Both Eyes, Every 12 hours    rosuvastatin (CRESTOR) 40 mg, oral, Daily     Allergies   Allergen Reactions    Gadolinium-Containing Contrast Media Nausea Only, Other and Rash    Prochlorperazine Swelling       Objective   Vitals:    04/22/24 1019   BP: 126/72   Pulse: 62   Weight: 82.1 kg (181 lb)   Height: 1.626 m (5' 4\")     Body mass index is 31.07 kg/m².    BP Readings from Last 3 Encounters:   04/22/24 126/72   04/02/24 128/74   02/14/24 132/80      Wt " Readings from Last 3 Encounters:   04/22/24 82.1 kg (181 lb)   04/02/24 84.2 kg (185 lb 9.6 oz)   02/14/24 82.6 kg (182 lb)        No visits with results within 1 Month(s) from this visit.   Latest known visit with results is:   Erroneous Encounter on 10/02/2023   Component Date Value    NONINV COLON CA DNA+OCC * 10/17/2023 Negative      Physical Exam  CONSTITUTIONAL - well nourished, well developed, looks like stated age, in no acute distress, not ill-appearing, and not tired appearing  SKIN - No lesions or rashes visualized.   HEAD - Atraumatic, normocephalic.  EYES - EOMI with normal external exam  RESP - respiration not labored   CARDIAC - extremities warm, well-perfused  PSYCHIATRIC - alert, oriented to time, place, person and no difficulty with speech or language  MUSCULOSKELETAL - left knee demonstrates medial joint line tenderness palpation, stable varus/valgus stress    Patient ID: Italia Pierce is a 63 y.o. female.    Joint Injection Large/Arthrocentesis: L knee on 4/22/2024 12:09 PM  Indications: pain  Details: 22 G needle, anterolateral approach  Outcome: tolerated well, no immediate complications    I explained to the patient the risks and benefits of a lidocaine injection into the patient's knee. These include pain at the site of the injection, local swelling, irritation from the injection, local discoloration of the skin, mild atrophy of the subcutaneous fat locally, possible irritation of the knee joint as a result of a reaction to the medications injected, risk of bleeding, and a risk of knee infection. It was explained to the patient that if they did have a flareup of pain in the evening following the injection that they should ice the knee 15 minutes at a time 3 times a day for up to 3 days and if the pain gets too significant or if they have any significant pain with range of motion that they did not experience pre-injection that they should go to a local Emergency Room right away.     After  understanding the risks and benefits, the patient decided to proceed with injection. The knee was prepped sterilely with Betadine and 3 mL of 2% Lidocaine was injected into the knee via the anterolateral portal site without complications. A bandage was applied at the site of the injection. The patient tolerated the procedure well.   Procedure, treatment alternatives, risks and benefits explained, specific risks discussed. Consent was given by the patient.       Assessment/Plan   Problem List Items Addressed This Visit       Primary osteoarthritis of left knee - Primary     Due to the fact it was too early for corticosteroid injection, we made a shared decision to provide you with an injection of lidocaine, hoping to provide you with some comfort until you are able to receive cortisone which would be anytime after May 14    Lidocaine injection provided today, see procedure note above         Relevant Orders    Joint Injection Large/Arthrocentesis: L knee (Completed)

## 2024-04-24 ENCOUNTER — APPOINTMENT (OUTPATIENT)
Dept: CARDIOLOGY | Facility: CLINIC | Age: 64
End: 2024-04-24
Payer: COMMERCIAL

## 2024-05-01 ENCOUNTER — HOSPITAL ENCOUNTER (OUTPATIENT)
Dept: CARDIOLOGY | Facility: CLINIC | Age: 64
Discharge: HOME | End: 2024-05-01
Payer: COMMERCIAL

## 2024-05-01 VITALS — HEART RATE: 58 BPM | SYSTOLIC BLOOD PRESSURE: 132 MMHG | DIASTOLIC BLOOD PRESSURE: 70 MMHG

## 2024-05-01 DIAGNOSIS — I47.10 PAROXYSMAL SUPRAVENTRICULAR TACHYCARDIA (CMS-HCC): ICD-10-CM

## 2024-05-01 DIAGNOSIS — I44.7 LEFT BUNDLE BRANCH BLOCK (LBBB): ICD-10-CM

## 2024-05-01 DIAGNOSIS — R00.2 PALPITATIONS: ICD-10-CM

## 2024-05-01 DIAGNOSIS — I47.29 PAROXYSMAL VENTRICULAR TACHYCARDIA (MULTI): ICD-10-CM

## 2024-05-01 PROCEDURE — A9502 TC99M TETROFOSMIN: HCPCS | Performed by: INTERNAL MEDICINE

## 2024-05-01 PROCEDURE — 93016 CV STRESS TEST SUPVJ ONLY: CPT | Performed by: INTERNAL MEDICINE

## 2024-05-01 PROCEDURE — 3430000001 HC RX 343 DIAGNOSTIC RADIOPHARMACEUTICALS: Performed by: INTERNAL MEDICINE

## 2024-05-01 PROCEDURE — 93018 CV STRESS TEST I&R ONLY: CPT | Performed by: INTERNAL MEDICINE

## 2024-05-01 PROCEDURE — 2500000004 HC RX 250 GENERAL PHARMACY W/ HCPCS (ALT 636 FOR OP/ED): Performed by: INTERNAL MEDICINE

## 2024-05-01 PROCEDURE — 78452 HT MUSCLE IMAGE SPECT MULT: CPT

## 2024-05-01 PROCEDURE — 78452 HT MUSCLE IMAGE SPECT MULT: CPT | Performed by: INTERNAL MEDICINE

## 2024-05-01 RX ORDER — REGADENOSON 0.08 MG/ML
0.4 INJECTION, SOLUTION INTRAVENOUS
Status: COMPLETED | OUTPATIENT
Start: 2024-05-01 | End: 2024-05-01

## 2024-05-01 RX ADMIN — TETROFOSMIN 30 MILLICURIE: 0.23 INJECTION, POWDER, LYOPHILIZED, FOR SOLUTION INTRAVENOUS at 12:58

## 2024-05-01 RX ADMIN — REGADENOSON 0.4 MG: 0.08 INJECTION, SOLUTION INTRAVENOUS at 13:23

## 2024-05-01 RX ADMIN — TETROFOSMIN 10 MILLICURIE: 0.23 INJECTION, POWDER, LYOPHILIZED, FOR SOLUTION INTRAVENOUS at 11:43

## 2024-05-16 ENCOUNTER — OFFICE VISIT (OUTPATIENT)
Dept: PRIMARY CARE | Facility: CLINIC | Age: 64
End: 2024-05-16
Payer: COMMERCIAL

## 2024-05-16 VITALS
BODY MASS INDEX: 30.9 KG/M2 | SYSTOLIC BLOOD PRESSURE: 122 MMHG | HEIGHT: 64 IN | DIASTOLIC BLOOD PRESSURE: 72 MMHG | HEART RATE: 72 BPM | WEIGHT: 181 LBS

## 2024-05-16 DIAGNOSIS — M17.12 PRIMARY OSTEOARTHRITIS OF LEFT KNEE: Primary | ICD-10-CM

## 2024-05-16 PROCEDURE — 4004F PT TOBACCO SCREEN RCVD TLK: CPT | Performed by: FAMILY MEDICINE

## 2024-05-16 PROCEDURE — 1159F MED LIST DOCD IN RCRD: CPT | Performed by: FAMILY MEDICINE

## 2024-05-16 PROCEDURE — 20610 DRAIN/INJ JOINT/BURSA W/O US: CPT | Performed by: FAMILY MEDICINE

## 2024-05-16 PROCEDURE — 3078F DIAST BP <80 MM HG: CPT | Performed by: FAMILY MEDICINE

## 2024-05-16 PROCEDURE — 3074F SYST BP LT 130 MM HG: CPT | Performed by: FAMILY MEDICINE

## 2024-05-16 PROCEDURE — 1123F ACP DISCUSS/DSCN MKR DOCD: CPT | Performed by: FAMILY MEDICINE

## 2024-05-16 PROCEDURE — 1160F RVW MEDS BY RX/DR IN RCRD: CPT | Performed by: FAMILY MEDICINE

## 2024-05-16 RX ORDER — TRIAMCINOLONE ACETONIDE 40 MG/ML
40 INJECTION, SUSPENSION INTRA-ARTICULAR; INTRAMUSCULAR ONCE
Status: COMPLETED | OUTPATIENT
Start: 2024-05-16 | End: 2024-05-16

## 2024-05-16 RX ADMIN — TRIAMCINOLONE ACETONIDE 40 MG: 40 INJECTION, SUSPENSION INTRA-ARTICULAR; INTRAMUSCULAR at 11:14

## 2024-05-16 NOTE — PROGRESS NOTES
Subjective   Patient ID: Italia Pierce is a 64 y.o. female who presents for Knee Pain (Left knee).    Past Medical, Surgical, and Family History reviewed and updated in chart.    Reviewed all medications by prescribing practitioner or clinical pharmacist (such as prescriptions, OTCs, herbal therapies and supplements) and documented in the medical record.    MARIA INES Echavarria is here today seeking a corticosteroid injection into her left knee  Her last CSI was done on February 14  She was here in the office too early for cortisone shot last visit, so we just gave her some lidocaine to calm things down    Review of Systems  All pertinent positive symptoms are included in the history of present illness.    All other systems have been reviewed and are negative and noncontributory to this patient's current ailments.    Past Medical History:   Diagnosis Date    Essential (primary) hypertension 10/02/2013    Benign essential hypertension    Malignant neoplasm of unspecified site of unspecified female breast (Multi) 01/24/2019    Breast cancer    NSVT (nonsustained ventricular tachycardia) (Multi) 04/02/2024    Palpitations 04/02/2024    Paroxysmal supraventricular tachycardia (CMS-HCC) 04/02/2024    Personal history of other diseases of the nervous system and sense organs     History of glaucoma    Personal history of other endocrine, nutritional and metabolic disease     History of thyroid disease     Past Surgical History:   Procedure Laterality Date    BREAST BIOPSY Right 2020    benign    BREAST LUMPECTOMY Right 06/16/2014    Right Breast Lumpectomy    OTHER SURGICAL HISTORY  10/18/2013    Liposuction    OTHER SURGICAL HISTORY  10/18/2013    Otoplasty    OTHER SURGICAL HISTORY  02/10/2020    [place] lymph node biopsy procedure    REFRACTIVE SURGERY  10/18/2013    Corneal LASIK    TONSILLECTOMY  10/18/2013    Tonsillectomy     Social History     Tobacco Use    Smoking status: Every Day     Current packs/day: 0.50     Average  "packs/day: 0.5 packs/day for 40.0 years (20.0 ttl pk-yrs)     Types: Cigarettes    Smokeless tobacco: Never   Substance Use Topics    Alcohol use: Yes    Drug use: Never     Family History   Problem Relation Name Age of Onset    Macular degeneration Mother      Cancer Maternal Grandmother      Diabetes Maternal Grandmother      Cancer Maternal Grandfather       Immunization History   Administered Date(s) Administered    Flu vaccine (IIV4), preservative free *Check age/dose* 12/20/2017, 10/15/2020, 09/27/2021    Flu vaccine, quadrivalent, high-dose, preservative free, age 65y+ (FLUZONE) 09/11/2023    Influenza, seasonal, injectable 10/06/2009, 11/01/2010, 11/05/2013, 11/01/2022    Pfizer Purple Cap SARS-CoV-2 03/17/2021, 04/16/2021, 11/02/2021    Pneumococcal polysaccharide vaccine, 23-valent, age 2 years and older (PNEUMOVAX 23) 02/05/2020    Zoster, live 11/08/2022     Current Outpatient Medications   Medication Instructions    erythromycin (Romycin) 5 mg/gram (0.5 %) ophthalmic ointment Both Eyes, Nightly    hydroCHLOROthiazide (MICROZIDE) 12.5 mg, oral, Daily    latanoprost (Xalatan) 0.005 % ophthalmic solution ophthalmic (eye)    levothyroxine (SYNTHROID, LEVOXYL) 100 mcg, oral, Daily    meclizine (ANTIVERT) 25 mg, oral, 3 times daily PRN    olmesartan (BENICAR) 40 mg, oral, Daily    pantoprazole (PROTONIX) 40 mg, oral, Daily before breakfast    Restasis 0.05 % ophthalmic emulsion 1 drop, Both Eyes, Every 12 hours    rosuvastatin (CRESTOR) 40 mg, oral, Daily     Allergies   Allergen Reactions    Gadolinium-Containing Contrast Media Nausea Only, Other and Rash    Prochlorperazine Swelling       Objective   Vitals:    05/16/24 1113   BP: 122/72   Pulse: 72   Weight: 82.1 kg (181 lb)   Height: 1.626 m (5' 4\")     Body mass index is 31.07 kg/m².    BP Readings from Last 3 Encounters:   05/16/24 122/72   05/01/24 132/70   04/22/24 126/72      Wt Readings from Last 3 Encounters:   05/16/24 82.1 kg (181 lb)   04/22/24 " 82.1 kg (181 lb)   04/02/24 84.2 kg (185 lb 9.6 oz)        No visits with results within 1 Month(s) from this visit.   Latest known visit with results is:   Erroneous Encounter on 10/02/2023   Component Date Value    NONINV COLON CA DNA+OCC * 10/17/2023 Negative      Physical Exam  CONSTITUTIONAL - well nourished, well developed, looks like stated age, in no acute distress, not ill-appearing, and not tired appearing  SKIN - No lesions or rashes visualized.   HEAD - Atraumatic, normocephalic.  EYES - EOMI with normal external exam  RESP - respiration not labored   CARDIAC - extremities warm, well-perfused  PSYCHIATRIC - alert, oriented to time, place, person and no difficulty with speech or language  MUSCULOSKELETAL - left knee demonstrates medial joint line tenderness palpation, stable varus/valgus stress    Joint Injection Large/Arthrocentesis: L knee on 5/16/2024 11:27 AM  Details: 22 G needle, lateral approach  Outcome: tolerated well, no immediate complications    1% lidocaine (2 mL) and Kenalog 40 mg injected into the joint as noted on the examination. It was successful, without complication, and tolerated extremely well.  Procedure, treatment alternatives, risks and benefits explained, specific risks discussed. Consent was given by the patient. Immediately prior to procedure a time out was called to verify the correct patient, procedure, equipment, support staff and site/side marked as required. Patient was prepped and draped in the usual sterile fashion.       Assessment/Plan   Problem List Items Addressed This Visit       Primary osteoarthritis of left knee - Primary     We provided you with a cortisone injection for both acute and long-term relief  You can have this done every 3 months as necessary         Relevant Medications    triamcinolone acetonide (Kenalog-40) injection 40 mg (Completed)    Other Relevant Orders    Joint Injection Large/Arthrocentesis: L knee

## 2024-05-16 NOTE — ASSESSMENT & PLAN NOTE
We provided you with a cortisone injection for both acute and long-term relief  You can have this done every 3 months as necessary

## 2024-05-23 ENCOUNTER — APPOINTMENT (OUTPATIENT)
Dept: PRIMARY CARE | Facility: CLINIC | Age: 64
End: 2024-05-23
Payer: COMMERCIAL

## 2024-06-02 DIAGNOSIS — E03.9 HYPOTHYROIDISM, UNSPECIFIED: ICD-10-CM

## 2024-06-02 DIAGNOSIS — E78.2 MIXED HYPERLIPIDEMIA: ICD-10-CM

## 2024-06-03 RX ORDER — LEVOTHYROXINE SODIUM 100 UG/1
100 TABLET ORAL DAILY
Qty: 7 TABLET | Refills: 0 | Status: SHIPPED | OUTPATIENT
Start: 2024-06-03 | End: 2024-06-11 | Stop reason: SDUPTHER

## 2024-06-03 RX ORDER — ROSUVASTATIN CALCIUM 40 MG/1
40 TABLET, COATED ORAL DAILY
Qty: 30 TABLET | Refills: 0 | Status: SHIPPED | OUTPATIENT
Start: 2024-06-03 | End: 2024-06-11 | Stop reason: SDUPTHER

## 2024-06-04 DIAGNOSIS — R79.89 ELEVATED SERUM CREATININE: Primary | ICD-10-CM

## 2024-06-04 DIAGNOSIS — E78.2 MIXED HYPERLIPIDEMIA: ICD-10-CM

## 2024-06-04 DIAGNOSIS — E03.9 ACQUIRED HYPOTHYROIDISM: ICD-10-CM

## 2024-06-04 DIAGNOSIS — I10 BENIGN ESSENTIAL HYPERTENSION: ICD-10-CM

## 2024-06-06 DIAGNOSIS — I10 BENIGN ESSENTIAL HYPERTENSION: ICD-10-CM

## 2024-06-06 RX ORDER — OLMESARTAN MEDOXOMIL 40 MG/1
40 TABLET ORAL DAILY
Qty: 90 TABLET | Refills: 1 | Status: SHIPPED | OUTPATIENT
Start: 2024-06-06 | End: 2024-12-03

## 2024-06-10 ENCOUNTER — LAB (OUTPATIENT)
Dept: LAB | Facility: LAB | Age: 64
End: 2024-06-10
Payer: COMMERCIAL

## 2024-06-11 ENCOUNTER — LAB (OUTPATIENT)
Dept: LAB | Facility: LAB | Age: 64
End: 2024-06-11
Payer: COMMERCIAL

## 2024-06-11 DIAGNOSIS — E03.9 ACQUIRED HYPOTHYROIDISM: ICD-10-CM

## 2024-06-11 DIAGNOSIS — E78.2 MIXED HYPERLIPIDEMIA: ICD-10-CM

## 2024-06-11 DIAGNOSIS — E03.9 HYPOTHYROIDISM, UNSPECIFIED: ICD-10-CM

## 2024-06-11 DIAGNOSIS — I10 BENIGN ESSENTIAL HYPERTENSION: ICD-10-CM

## 2024-06-11 LAB
ALBUMIN SERPL BCP-MCNC: 4.3 G/DL (ref 3.4–5)
ALP SERPL-CCNC: 60 U/L (ref 33–136)
ALT SERPL W P-5'-P-CCNC: 32 U/L (ref 7–45)
ANION GAP SERPL CALC-SCNC: 16 MMOL/L (ref 10–20)
AST SERPL W P-5'-P-CCNC: 28 U/L (ref 9–39)
BILIRUB SERPL-MCNC: 0.5 MG/DL (ref 0–1.2)
BUN SERPL-MCNC: 15 MG/DL (ref 6–23)
CALCIUM SERPL-MCNC: 10 MG/DL (ref 8.6–10.6)
CHLORIDE SERPL-SCNC: 102 MMOL/L (ref 98–107)
CHOLEST SERPL-MCNC: 198 MG/DL (ref 0–199)
CHOLESTEROL/HDL RATIO: 1.9
CO2 SERPL-SCNC: 28 MMOL/L (ref 21–32)
CREAT SERPL-MCNC: 0.87 MG/DL (ref 0.5–1.05)
EGFRCR SERPLBLD CKD-EPI 2021: 74 ML/MIN/1.73M*2
GLUCOSE SERPL-MCNC: 74 MG/DL (ref 74–99)
HDLC SERPL-MCNC: 103.5 MG/DL
LDLC SERPL CALC-MCNC: 70 MG/DL
NON HDL CHOLESTEROL: 95 MG/DL (ref 0–149)
POTASSIUM SERPL-SCNC: 4.5 MMOL/L (ref 3.5–5.3)
PROT SERPL-MCNC: 6.7 G/DL (ref 6.4–8.2)
SODIUM SERPL-SCNC: 141 MMOL/L (ref 136–145)
TRIGL SERPL-MCNC: 124 MG/DL (ref 0–149)
TSH SERPL-ACNC: 1.88 MIU/L (ref 0.44–3.98)
VLDL: 25 MG/DL (ref 0–40)

## 2024-06-11 PROCEDURE — 80053 COMPREHEN METABOLIC PANEL: CPT

## 2024-06-11 PROCEDURE — 80061 LIPID PANEL: CPT

## 2024-06-11 PROCEDURE — 84443 ASSAY THYROID STIM HORMONE: CPT

## 2024-06-11 PROCEDURE — 36415 COLL VENOUS BLD VENIPUNCTURE: CPT

## 2024-06-11 RX ORDER — ROSUVASTATIN CALCIUM 40 MG/1
40 TABLET, COATED ORAL DAILY
Qty: 90 TABLET | Refills: 1 | Status: SHIPPED | OUTPATIENT
Start: 2024-06-11 | End: 2024-12-08

## 2024-06-11 RX ORDER — HYDROCHLOROTHIAZIDE 12.5 MG/1
12.5 TABLET ORAL DAILY
Qty: 90 TABLET | Refills: 1 | Status: SHIPPED | OUTPATIENT
Start: 2024-06-11 | End: 2024-12-08

## 2024-06-11 RX ORDER — LEVOTHYROXINE SODIUM 100 UG/1
100 TABLET ORAL DAILY
Qty: 90 TABLET | Refills: 1 | Status: SHIPPED | OUTPATIENT
Start: 2024-06-11 | End: 2024-12-08

## 2024-06-12 NOTE — RESULT ENCOUNTER NOTE
Cholesterol excellent at 1 9 you with LDL 70  Sugar, kidney, liver, electrolytes normal  Thyroid looks perfect with a TSH of 1.88  Whatever you are doing, keep it up  Will send over levothyroxine, rosuvastatin, and hydrochlorothiazide so that they all will and at the beginning of December

## 2024-06-13 DIAGNOSIS — R42 VERTIGO: Primary | ICD-10-CM

## 2024-07-09 DIAGNOSIS — I10 BENIGN ESSENTIAL HYPERTENSION: ICD-10-CM

## 2024-07-09 RX ORDER — OLMESARTAN MEDOXOMIL 40 MG/1
40 TABLET ORAL DAILY
Qty: 90 TABLET | Refills: 2 | Status: SHIPPED | OUTPATIENT
Start: 2024-07-09 | End: 2025-04-05

## 2024-07-22 ENCOUNTER — CLINICAL SUPPORT (OUTPATIENT)
Dept: PHYSICAL THERAPY | Facility: CLINIC | Age: 64
End: 2024-07-22
Payer: COMMERCIAL

## 2024-07-22 DIAGNOSIS — R42 VERTIGO: ICD-10-CM

## 2024-07-22 PROCEDURE — 97161 PT EVAL LOW COMPLEX 20 MIN: CPT | Mod: GP

## 2024-07-22 PROCEDURE — 97112 NEUROMUSCULAR REEDUCATION: CPT | Mod: GP

## 2024-07-22 ASSESSMENT — ENCOUNTER SYMPTOMS
OCCASIONAL FEELINGS OF UNSTEADINESS: 0
LOSS OF SENSATION IN FEET: 0
DEPRESSION: 0

## 2024-07-22 NOTE — PROGRESS NOTES
Physical Therapy Evaluation    Subjective:  Patient Name: Italia Pierce  MRN: [id number]  Today's Date: 7/22/2024  Visit: 1/15  Referred by: Curtis Zhang  Time Calculation  Start Time: 1000  Stop Time: 1045  Time Calculation (min): 45 min  Diagnosis: Patient with dizziness x 2 months.  Describes dizziness that is present with driving ([place])  Bad on bridges, with trucks passing.  Feels ground is moving up and down.  Some difficulty going down stairs.  Once in a while, feels like ground is moving when she leans backwards.  Sometimes, bending fwd  Had dizziness previously (1 year ago) but entailed more vertigo).  Sx resolved for about 5 months, then returned 2 months ago.  Tried meclizine but made sx worse  HPI: cataracts (will have surgery soon).  Does feel sx started around the time her vision began to worsen.  Denies ear pressure/pain, fullness, headaches, neck pain, numbness/tingling.  PMH: supraventricular tachycardia, HTN    Current Medical Management: none thus far  Precautions: none  Functional Limitations: fearful of driving, [place] on highway  Prior Level of Function: golfing.  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  Work Status: sales, entails a lot of driving  Living Environment: no concerns  Social Support:   Personal Factors that may impact care: none    Objective:    Gait: unremarkable    Cervical ROM:  Flexion= WNL   Extension= mild dizziness  Sidebend= WNL  Rotation= WNL    VOMS:  Smooth Pursuits= mild saccades with vertical  Saccades=WNL  VOR horizontal= WNL    Positional Testing:  Yg Hallpke:  R= NT  L= positive L torsional nystagmus with delayed onset of sx and prolonged nystagmus (> 1 min)  Roll Test:  R= NT  L= NT    Treatment Performed Today:  Performed L Epley with vibration at mastoid x 2 reps for possible L cupulolisthesis.  Instructed patient to avoid cervical flexion x 24 hours and to sleep upright 1 night.    Assessment:  63 yo F with sx indicating L cupulolisthesis upon  [place]-Hallpike testing.  However, subjectively, sx are mainly present when driving which is not consistent with BPPV.  Would benefit from continued PT to further assess sx and to treat, possible with repositioning and optokinetic training.  Problem List:  Dizziness, difficulty driving, possibly BPPV  Level of Complexity:  moderate  Plan:  -Goals:  Active       PT Problem       Further assess for BPPV and treat as needed       Start:  07/22/24    Expected End:  08/05/24            Negative dizziness with bed mobility (negative [place]-Hallpike)       Start:  07/22/24    Expected End:  10/20/24            Able to drive on highway without dizziness       Start:  07/22/24    Expected End:  10/20/24            Patient reports increased confidence descending stairs.       Start:  07/22/24    Expected End:  10/20/24              -Frequency & Duration:  1-2x/week x 4-6 weeks  -Rehab Potential:  Good    Plan of care was developed with input and agreement of the patient.    Billing:  PT Evaluation Time Entry  PT Evaluation (Low) Time Entry: 30  PT Therapeutic Procedures Time Entry  Neuromuscular Re-Education Time Entry: 15

## 2024-07-26 ENCOUNTER — TREATMENT (OUTPATIENT)
Dept: PHYSICAL THERAPY | Facility: CLINIC | Age: 64
End: 2024-07-26
Payer: COMMERCIAL

## 2024-07-26 DIAGNOSIS — R42 VERTIGO: ICD-10-CM

## 2024-07-26 PROCEDURE — 97112 NEUROMUSCULAR REEDUCATION: CPT | Mod: GP

## 2024-07-26 NOTE — PROGRESS NOTES
Physical Therapy Treatment    Patient Name: Italia Pierce  MRN: [id number]  Today's Date: 7/26/2024   Visit 2/15  Time Calculation  Start Time: 1000  Stop Time: 1030  Time Calculation (min): 30 min  1. Vertigo  Follow Up In Physical Therapy          PRECAUTIONS: none    SUBJECTIVE:  Patient reports has not had any dizziness since last visit.  Was able to drive to [place] without dizziness and also has no sx when leans back.  Does not feel like the floor is moving.  Did not try to lay with head elevated though either.  Pain level: NA  Compliant with HEP? NA    OBJECTIVE:  [place]-Hallpike R= negative  Yg-Hallpike L= + mild nystagmus and c/o dizziness  Roll Test= negative bilat    TREATMENT:  - Neuromuscular Re-education:   Performed Epley for L post canal cupulolisthesis with use of vibration to L mastoid.    ASSESSMENT:   Patient with much improved sx and now able to drive without dizziness.  Does still have mild sx in L [place]-Hallpike but to much less degree.  EDUCATION:  Issued handout for self Epley if needed in the future.  Also gave handout for vestibular optokinetic training to use if needed (if dizziness returns when driving).  PLAN:   Scheduled for 1 follow up.  Will cancel if no longer has sx present.    Billing:     PT Therapeutic Procedures Time Entry  Neuromuscular Re-Education Time Entry: 30

## 2024-08-05 ENCOUNTER — APPOINTMENT (OUTPATIENT)
Dept: PHYSICAL THERAPY | Facility: CLINIC | Age: 64
End: 2024-08-05
Payer: COMMERCIAL

## 2024-08-09 ENCOUNTER — APPOINTMENT (OUTPATIENT)
Dept: PHYSICAL THERAPY | Facility: CLINIC | Age: 64
End: 2024-08-09
Payer: COMMERCIAL

## 2024-08-13 ENCOUNTER — TREATMENT (OUTPATIENT)
Dept: PHYSICAL THERAPY | Facility: CLINIC | Age: 64
End: 2024-08-13
Payer: COMMERCIAL

## 2024-08-13 DIAGNOSIS — R42 VERTIGO: ICD-10-CM

## 2024-08-13 PROCEDURE — 97112 NEUROMUSCULAR REEDUCATION: CPT | Mod: GP

## 2024-08-13 NOTE — PROGRESS NOTES
Physical Therapy Treatment    Patient Name: Italia Pierce  MRN: [id number]  Today's Date: 8/13/2024   Visit 3/15  Time Calculation  Start Time: 1200  Stop Time: 1230  Time Calculation (min): 30 min  1. Vertigo  Follow Up In Physical Therapy          PRECAUTIONS: none    SUBJECTIVE:  Patient reports driving is 95% better.    Has noticed that has mild sx if SB body to the L or leans back slightly.  Pain level: NA  Compliant with HEP? NA    OBJECTIVE:  [place]-Hallpike R= negative  [place]-Hallpike L= + mild nystagmus and c/o dizziness    TREATMENT:  - Neuromuscular Re-education:   Performed Epley for L post canal cupulolisthesis with use of vibration to L mastoid.  Rechecked with [place]-Hallpike= quick onset and short lasting nystagmus.   Repeated L Epley without vibration    ASSESSMENT:   Patient did still have + L Yg-hallpike for cupulolisthesis.  Appreared to tolerate Epley well.  EDUCATION:  Instructed in [facility] to perform if needed at home.  PLAN:   Patient to contact office in a few days to report sx and if needs further PT visits scheduled.    Billing:     PT Therapeutic Procedures Time Entry  Neuromuscular Re-Education Time Entry: 30

## 2024-09-09 ENCOUNTER — APPOINTMENT (OUTPATIENT)
Dept: PRIMARY CARE | Facility: CLINIC | Age: 64
End: 2024-09-09
Payer: COMMERCIAL

## 2024-09-09 VITALS
DIASTOLIC BLOOD PRESSURE: 70 MMHG | SYSTOLIC BLOOD PRESSURE: 130 MMHG | HEART RATE: 86 BPM | WEIGHT: 174 LBS | BODY MASS INDEX: 29.71 KG/M2 | HEIGHT: 64 IN

## 2024-09-09 DIAGNOSIS — M17.12 PRIMARY OSTEOARTHRITIS OF LEFT KNEE: Primary | ICD-10-CM

## 2024-09-09 DIAGNOSIS — M25.562 ACUTE PAIN OF LEFT KNEE: ICD-10-CM

## 2024-09-09 PROCEDURE — 1123F ACP DISCUSS/DSCN MKR DOCD: CPT | Performed by: FAMILY MEDICINE

## 2024-09-09 PROCEDURE — 3078F DIAST BP <80 MM HG: CPT | Performed by: FAMILY MEDICINE

## 2024-09-09 PROCEDURE — 99214 OFFICE O/P EST MOD 30 MIN: CPT | Performed by: FAMILY MEDICINE

## 2024-09-09 PROCEDURE — 3008F BODY MASS INDEX DOCD: CPT | Performed by: FAMILY MEDICINE

## 2024-09-09 PROCEDURE — 4004F PT TOBACCO SCREEN RCVD TLK: CPT | Performed by: FAMILY MEDICINE

## 2024-09-09 PROCEDURE — 20610 DRAIN/INJ JOINT/BURSA W/O US: CPT | Performed by: FAMILY MEDICINE

## 2024-09-09 PROCEDURE — 1159F MED LIST DOCD IN RCRD: CPT | Performed by: FAMILY MEDICINE

## 2024-09-09 PROCEDURE — 3075F SYST BP GE 130 - 139MM HG: CPT | Performed by: FAMILY MEDICINE

## 2024-09-09 RX ORDER — TRIAMCINOLONE ACETONIDE 40 MG/ML
40 INJECTION, SUSPENSION INTRA-ARTICULAR; INTRAMUSCULAR ONCE
Status: COMPLETED | OUTPATIENT
Start: 2024-09-09 | End: 2024-09-09

## 2024-09-09 NOTE — PROGRESS NOTES
Subjective   Patient ID: Italia Pierce is a 64 y.o. female who presents for Knee Pain.    Past Medical, Surgical, and Family History reviewed and updated in chart.    Reviewed all medications by prescribing practitioner or clinical pharmacist (such as prescriptions, OTCs, herbal therapies and supplements) and documented in the medical record.    HPI  Italia has been receiving cortisone injections, with the last administration in May 2024. She notes that the pain migrates across her anterior knee and occasionally extends behind her knee, raising concerns about bursitis. The effectiveness of the cortisone injections appears to be diminishing.     The most recent imaging of her knee, conducted in 2022, showed no significant degenerative changes or acute radiographic findings. Italia is uncertain if her symptoms are actually secondary to osteoarthritis.    She reports an acute exacerbation of her discomfort, which she attributes to increased activity. She has been golfing more and walking more frequently.    Review of Systems  All pertinent positive symptoms are included in the history of present illness.    All other systems have been reviewed and are negative and noncontributory to this patient's current ailments.    Past Medical History:   Diagnosis Date    Essential (primary) hypertension 10/02/2013    Benign essential hypertension    Malignant neoplasm of unspecified site of unspecified female breast (Multi) 01/24/2019    Breast cancer    NSVT (nonsustained ventricular tachycardia) (Multi) 04/02/2024    Palpitations 04/02/2024    Paroxysmal supraventricular tachycardia (CMS-HCC) 04/02/2024    Personal history of other diseases of the nervous system and sense organs     History of glaucoma    Personal history of other endocrine, nutritional and metabolic disease     History of thyroid disease     Past Surgical History:   Procedure Laterality Date    BREAST BIOPSY Right 2020    benign    BREAST LUMPECTOMY Right  06/16/2014    Right Breast Lumpectomy    OTHER SURGICAL HISTORY  10/18/2013    Liposuction    OTHER SURGICAL HISTORY  10/18/2013    Otoplasty    OTHER SURGICAL HISTORY  02/10/2020    [place] lymph node biopsy procedure    REFRACTIVE SURGERY  10/18/2013    Corneal LASIK    TONSILLECTOMY  10/18/2013    Tonsillectomy     Social History     Tobacco Use    Smoking status: Every Day     Current packs/day: 0.50     Average packs/day: 0.5 packs/day for 40.0 years (20.0 ttl pk-yrs)     Types: Cigarettes    Smokeless tobacco: Never   Substance Use Topics    Alcohol use: Yes    Drug use: Never     Family History   Problem Relation Name Age of Onset    Macular degeneration Mother      Cancer Maternal Grandmother      Diabetes Maternal Grandmother      Cancer Maternal Grandfather       Immunization History   Administered Date(s) Administered    Flu vaccine (IIV4), preservative free *Check age/dose* 11/23/2015, 12/20/2017, 10/15/2020, 09/27/2021    Flu vaccine, quadrivalent, high-dose, preservative free, age 65y+ (FLUZONE) 09/11/2023    Influenza, seasonal, injectable 10/06/2009, 11/01/2010, 11/05/2013, 11/01/2022    Pfizer Purple Cap SARS-CoV-2 03/17/2021, 04/16/2021, 11/02/2021    Pneumococcal polysaccharide vaccine, 23-valent, age 2 years and older (PNEUMOVAX 23) 02/05/2020    Zoster, live 11/08/2022     Current Outpatient Medications   Medication Instructions    erythromycin (Romycin) 5 mg/gram (0.5 %) ophthalmic ointment Both Eyes, Nightly    hydroCHLOROthiazide (MICROZIDE) 12.5 mg, oral, Daily    latanoprost (Xalatan) 0.005 % ophthalmic solution ophthalmic (eye)    levothyroxine (SYNTHROID, LEVOXYL) 100 mcg, oral, Daily    meclizine (ANTIVERT) 25 mg, oral, 3 times daily PRN    olmesartan (BENICAR) 40 mg, oral, Daily    pantoprazole (PROTONIX) 40 mg, oral, Daily before breakfast    Restasis 0.05 % ophthalmic emulsion 1 drop, Both Eyes, Every 12 hours    rosuvastatin (CRESTOR) 40 mg, oral, Daily     Allergies   Allergen  "Reactions    Gadolinium-Containing Contrast Media Nausea Only, Other and Rash    Prochlorperazine Swelling     Objective   Vitals:    09/09/24 1316   BP: 130/70   Pulse: 86   Weight: 78.9 kg (174 lb)   Height: 1.626 m (5' 4\")     Body mass index is 29.87 kg/m².    BP Readings from Last 3 Encounters:   09/09/24 130/70   05/16/24 122/72   05/01/24 132/70      Wt Readings from Last 3 Encounters:   09/09/24 78.9 kg (174 lb)   05/16/24 82.1 kg (181 lb)   04/22/24 82.1 kg (181 lb)      Physical Exam  CONSTITUTIONAL - well nourished, well developed, looks like stated age, in no acute distress, not ill-appearing, and not tired appearing  SKIN - normal skin color and pigmentation, normal skin turgor without rash, lesions, or nodules visualized  HEAD - no trauma, normocephalic  EYES - extraocular muscles are intact, and normal external exam  CHEST - In no obvious respiratory distress.   EXTREMITIES - no obvious or evident edema, no popliteal fossa fullness; left knee without redness, warmth, ecchymosis, although there is some crepitus palpated; left knee with very little opening on the lateral aspect near the condyle  NEUROLOGICAL - alert, oriented and no focal signs  PSYCHIATRIC - alert, pleasant and cordial, age-appropriate    Joint Injection Large/Arthrocentesis: L knee on 9/9/2024 2:57 PM  Indications: pain  Details: 22 G needle, lateral approach  Outcome: tolerated well, no immediate complications    Please return to the clinic if pain, swelling, or signs of infection occur. It will be necessary to further evaluate you at that time, along with considering the possibility of an orthopedic consultation to provide further medical management  Procedure, treatment alternatives, risks and benefits explained, specific risks discussed. Consent was given by the patient. Immediately prior to procedure a time out was called to verify the correct patient, procedure, equipment, support staff and site/side marked as required. Patient " was prepped and draped in the usual sterile fashion.       Assessment/Plan   Problem List Items Addressed This Visit       Primary osteoarthritis of left knee - Primary     Provided CSI for both acute and long-term relief         Acute pain of left knee     Please obtain an x-ray of your knee as soon as you have time to do so  We will be able to compare to previous x-ray that was done in 2022  Based on your exam, I suspect there is some underlying arthritis         Relevant Medications    triamcinolone acetonide (Kenalog-40) injection 40 mg (Completed)    Other Relevant Orders    XR knee left 3 views    Joint Injection Large/Arthrocentesis: L knee

## 2024-09-09 NOTE — ASSESSMENT & PLAN NOTE
Please obtain an x-ray of your knee as soon as you have time to do so  We will be able to compare to previous x-ray that was done in 2022  Based on your exam, I suspect there is some underlying arthritis

## 2024-10-08 ENCOUNTER — OFFICE VISIT (OUTPATIENT)
Dept: URGENT CARE | Age: 64
End: 2024-10-08
Payer: COMMERCIAL

## 2024-10-08 VITALS
DIASTOLIC BLOOD PRESSURE: 86 MMHG | SYSTOLIC BLOOD PRESSURE: 145 MMHG | BODY MASS INDEX: 29.87 KG/M2 | OXYGEN SATURATION: 99 % | TEMPERATURE: 99.2 F | WEIGHT: 174 LBS | HEART RATE: 60 BPM | RESPIRATION RATE: 16 BRPM

## 2024-10-08 DIAGNOSIS — J10.1 INFLUENZA A: Primary | ICD-10-CM

## 2024-10-08 DIAGNOSIS — Z20.822 EXPOSURE TO CONFIRMED CASE OF COVID-19: ICD-10-CM

## 2024-10-08 DIAGNOSIS — R05.1 ACUTE COUGH: ICD-10-CM

## 2024-10-08 RX ORDER — OSELTAMIVIR PHOSPHATE 75 MG/1
75 CAPSULE ORAL EVERY 12 HOURS
Qty: 10 CAPSULE | Refills: 0 | Status: SHIPPED | OUTPATIENT
Start: 2024-10-08 | End: 2024-10-13

## 2024-10-08 NOTE — PROGRESS NOTES
Subjective   History of Present Illness: Italia Pierce is a 64 y.o. female. They present today with a chief complaint of a non productive cough x 3 days. Have had her influenza vaccine for this season. Taking Advil for symptoms.       Past Medical History  Allergies as of 10/08/2024 - Reviewed 10/08/2024   Allergen Reaction Noted    Gadolinium-containing contrast media Nausea Only, Other, and Rash 03/09/2023    Prochlorperazine Swelling 03/09/2023       (Not in a hospital admission)       Past Medical History:   Diagnosis Date    Essential (primary) hypertension 10/02/2013    Benign essential hypertension    Malignant neoplasm of unspecified site of unspecified female breast (Multi) 01/24/2019    Breast cancer    NSVT (nonsustained ventricular tachycardia) (Multi) 04/02/2024    Palpitations 04/02/2024    Paroxysmal supraventricular tachycardia (CMS-HCC) 04/02/2024    Personal history of other diseases of the nervous system and sense organs     History of glaucoma    Personal history of other endocrine, nutritional and metabolic disease     History of thyroid disease       Past Surgical History:   Procedure Laterality Date    BREAST BIOPSY Right 2020    benign    BREAST LUMPECTOMY Right 06/16/2014    Right Breast Lumpectomy    OTHER SURGICAL HISTORY  10/18/2013    Liposuction    OTHER SURGICAL HISTORY  10/18/2013    Otoplasty    OTHER SURGICAL HISTORY  02/10/2020    [place] lymph node biopsy procedure    REFRACTIVE SURGERY  10/18/2013    Corneal LASIK    TONSILLECTOMY  10/18/2013    Tonsillectomy        reports that she has been smoking cigarettes. She has a 20 pack-year smoking history. She has never used smokeless tobacco. She reports current alcohol use. She reports that she does not use drugs.    Review of Systems  Review of Systems                               Objective    Vitals:    10/08/24 1415   BP: 145/86   Pulse: 60   Resp: 16   Temp: 37.3 °C (99.2 °F)   SpO2: 99%   Weight: 78.9 kg (174 lb)     No LMP  recorded. Patient is postmenopausal.    Physical Exam  HENT:      Head: Normocephalic and atraumatic.      Nose: Nose normal.      Mouth/Throat:      Mouth: Mucous membranes are moist.   Eyes:      Extraocular Movements: Extraocular movements intact.      Conjunctiva/sclera: Conjunctivae normal.      Pupils: Pupils are equal, round, and reactive to light.   Cardiovascular:      Rate and Rhythm: Normal rate and regular rhythm.   Pulmonary:      Effort: Pulmonary effort is normal.      Breath sounds: Normal breath sounds and air entry. No wheezing, rhonchi or rales.   Skin:     General: Skin is warm.   Neurological:      Mental Status: She is alert and oriented to person, place, and time.   Psychiatric:         Mood and Affect: Mood normal.         Behavior: Behavior normal.             Point of Care Test & Imaging Results from this visit  No results found for this visit on 10/08/24.   No results found.    Diagnostic study results (if any) were reviewed by Brayan Helm PA-C.    Assessment/Plan   Allergies, medications, history, and pertinent labs/EKGs/Imaging reviewed by Brayan Helm PA-C.     Medical Decision Making  -         Patient is educated about their diagnoses.     -          Discussed medications benefits and adverse effects.     -          Answered all patient’s questions.     -          Patient will call 911 or go to the nearest ED if worsen symptoms .     -          Patient is agreeable to the plan of care and is deemed stable upon discharge.     -          Follow up with your primary care provider in two days.      Orders and Diagnoses  Diagnoses and all orders for this visit:  Influenza A  -     POCT Influenza A/B manually resulted  -     oseltamivir (Tamiflu) 75 mg capsule; Take 1 capsule (75 mg) by mouth every 12 hours for 5 days.  Acute cough  -     POCT Influenza A/B manually resulted  -     POCT Covid-19 Rapid Antigen  Exposure to confirmed case of COVID-19  -     POCT Covid-19 Rapid  Antigen      Medical Admin Record      Patient disposition: Home    Electronically signed by Brayan Helm PA-C  2:32 PM

## 2024-12-09 ENCOUNTER — TELEPHONE (OUTPATIENT)
Dept: OBSTETRICS AND GYNECOLOGY | Facility: CLINIC | Age: 64
End: 2024-12-09
Payer: COMMERCIAL

## 2024-12-09 DIAGNOSIS — Z12.31 BREAST CANCER SCREENING BY MAMMOGRAM: Primary | ICD-10-CM

## 2024-12-16 ENCOUNTER — TELEPHONE (OUTPATIENT)
Dept: PRIMARY CARE | Facility: CLINIC | Age: 64
End: 2024-12-16
Payer: COMMERCIAL

## 2024-12-16 DIAGNOSIS — Z87.891 FORMER SMOKER: Primary | ICD-10-CM

## 2024-12-26 ENCOUNTER — HOSPITAL ENCOUNTER (OUTPATIENT)
Dept: RADIOLOGY | Facility: CLINIC | Age: 64
Discharge: HOME | End: 2024-12-26
Payer: COMMERCIAL

## 2024-12-26 DIAGNOSIS — M25.562 ACUTE PAIN OF LEFT KNEE: ICD-10-CM

## 2024-12-26 PROCEDURE — 73562 X-RAY EXAM OF KNEE 3: CPT | Mod: LT

## 2024-12-30 DIAGNOSIS — E03.9 HYPOTHYROIDISM, UNSPECIFIED: ICD-10-CM

## 2024-12-30 DIAGNOSIS — M17.12 PRIMARY OSTEOARTHRITIS OF LEFT KNEE: Primary | ICD-10-CM

## 2024-12-30 RX ORDER — LEVOTHYROXINE SODIUM 100 UG/1
100 TABLET ORAL DAILY
Qty: 30 TABLET | Refills: 0 | Status: SHIPPED | OUTPATIENT
Start: 2024-12-30 | End: 2025-01-29

## 2025-01-07 ENCOUNTER — APPOINTMENT (OUTPATIENT)
Dept: ORTHOPEDIC SURGERY | Facility: CLINIC | Age: 65
End: 2025-01-07
Payer: COMMERCIAL

## 2025-01-07 ENCOUNTER — HOSPITAL ENCOUNTER (OUTPATIENT)
Dept: RADIOLOGY | Facility: CLINIC | Age: 65
End: 2025-01-07
Payer: COMMERCIAL

## 2025-01-09 ENCOUNTER — HOSPITAL ENCOUNTER (OUTPATIENT)
Dept: RADIOLOGY | Facility: CLINIC | Age: 65
Discharge: HOME | End: 2025-01-09
Payer: COMMERCIAL

## 2025-01-09 DIAGNOSIS — Z12.31 BREAST CANCER SCREENING BY MAMMOGRAM: ICD-10-CM

## 2025-01-09 PROCEDURE — 77067 SCR MAMMO BI INCL CAD: CPT

## 2025-01-14 ENCOUNTER — APPOINTMENT (OUTPATIENT)
Dept: PRIMARY CARE | Facility: CLINIC | Age: 65
End: 2025-01-14
Payer: COMMERCIAL

## 2025-01-14 ENCOUNTER — APPOINTMENT (OUTPATIENT)
Dept: RADIOLOGY | Facility: CLINIC | Age: 65
End: 2025-01-14
Payer: COMMERCIAL

## 2025-01-14 VITALS
BODY MASS INDEX: 29.71 KG/M2 | HEIGHT: 64 IN | SYSTOLIC BLOOD PRESSURE: 124 MMHG | WEIGHT: 174 LBS | DIASTOLIC BLOOD PRESSURE: 80 MMHG | HEART RATE: 66 BPM

## 2025-01-14 DIAGNOSIS — G89.29 CHRONIC PAIN OF RIGHT KNEE: ICD-10-CM

## 2025-01-14 DIAGNOSIS — M17.12 PRIMARY OSTEOARTHRITIS OF LEFT KNEE: Primary | ICD-10-CM

## 2025-01-14 DIAGNOSIS — M25.561 CHRONIC PAIN OF RIGHT KNEE: ICD-10-CM

## 2025-01-14 PROCEDURE — 1123F ACP DISCUSS/DSCN MKR DOCD: CPT | Performed by: FAMILY MEDICINE

## 2025-01-14 PROCEDURE — 20610 DRAIN/INJ JOINT/BURSA W/O US: CPT

## 2025-01-14 PROCEDURE — 1159F MED LIST DOCD IN RCRD: CPT | Performed by: FAMILY MEDICINE

## 2025-01-14 PROCEDURE — 3008F BODY MASS INDEX DOCD: CPT | Performed by: FAMILY MEDICINE

## 2025-01-14 PROCEDURE — 4004F PT TOBACCO SCREEN RCVD TLK: CPT | Performed by: FAMILY MEDICINE

## 2025-01-14 PROCEDURE — 99213 OFFICE O/P EST LOW 20 MIN: CPT | Performed by: FAMILY MEDICINE

## 2025-01-14 PROCEDURE — 1160F RVW MEDS BY RX/DR IN RCRD: CPT | Performed by: FAMILY MEDICINE

## 2025-01-14 PROCEDURE — 3079F DIAST BP 80-89 MM HG: CPT | Performed by: FAMILY MEDICINE

## 2025-01-14 PROCEDURE — 3074F SYST BP LT 130 MM HG: CPT | Performed by: FAMILY MEDICINE

## 2025-01-14 RX ORDER — TRIAMCINOLONE ACETONIDE 40 MG/ML
40 INJECTION, SUSPENSION INTRA-ARTICULAR; INTRAMUSCULAR ONCE
Status: COMPLETED | OUTPATIENT
Start: 2025-01-14 | End: 2025-01-14

## 2025-01-14 RX ADMIN — TRIAMCINOLONE ACETONIDE 40 MG: 40 INJECTION, SUSPENSION INTRA-ARTICULAR; INTRAMUSCULAR at 11:44

## 2025-01-14 ASSESSMENT — PATIENT HEALTH QUESTIONNAIRE - PHQ9
1. LITTLE INTEREST OR PLEASURE IN DOING THINGS: NOT AT ALL
SUM OF ALL RESPONSES TO PHQ9 QUESTIONS 1 AND 2: 0
2. FEELING DOWN, DEPRESSED OR HOPELESS: NOT AT ALL

## 2025-01-14 NOTE — PROGRESS NOTES
Subjective   Patient ID: Italia Pierce is a 64 y.o. female who presents for Knee Pain (Left knee more however both hurt her).    Past Medical, Surgical, and Family History reviewed and updated in chart.    Reviewed all medications by prescribing practitioner or clinical pharmacist (such as prescriptions, OTCs, herbal therapies and supplements) and documented in the medical record.    Knee Pain     Italia has been managing her left knee pain with cortisone injections, the last of which was administered in September 2024. However, she reports worsening pain, particularly with knee movements from flexion to extension, and occasionally experiences pain extending behind the knee. The effectiveness of the cortisone injections appears to be decreasing, with the last injection providing relief for approximately three months.    Recent imaging, conducted last month, revealed tricompartmental arthritis in the left knee. Italia has an upcoming appointment with Dr. Goncalves in a few weeks, but she is requesting another cortisone injection for her left knee at this time.    Additionally, Italia is experiencing right anterior knee pain, also associated with movement from flexion to extension. As an avid [employer] user, she has managed to lose over 20 pounds in the past two years, which has helped alleviate some of her knee pain. She is also requesting a cortisone injection for her right knee to address this new pain.    Review of Systems  All pertinent positive symptoms are included in the history of present illness.    All other systems have been reviewed and are negative and noncontributory to this patient's current ailments.    Past Medical History:   Diagnosis Date    Breast cyst 2013    Essential (primary) hypertension 10/02/2013    Benign essential hypertension    Malignant neoplasm of unspecified site of unspecified female breast 01/24/2019    Breast cancer    NSVT (nonsustained ventricular tachycardia) (Multi) 04/02/2024     Palpitations 04/02/2024    Paroxysmal supraventricular tachycardia (CMS-HCC) 04/02/2024    Personal history of irradiation 2013    Personal history of other diseases of the nervous system and sense organs     History of glaucoma    Personal history of other endocrine, nutritional and metabolic disease     History of thyroid disease     Past Surgical History:   Procedure Laterality Date    BREAST BIOPSY Right 2020    benign    BREAST LUMPECTOMY Right 06/16/2014    Right Breast Lumpectomy    OTHER SURGICAL HISTORY  10/18/2013    Liposuction    OTHER SURGICAL HISTORY  10/18/2013    Otoplasty    OTHER SURGICAL HISTORY  02/10/2020    [place] lymph node biopsy procedure    REFRACTIVE SURGERY  10/18/2013    Corneal LASIK    TONSILLECTOMY  10/18/2013    Tonsillectomy     Social History     Tobacco Use    Smoking status: Every Day     Current packs/day: 0.50     Average packs/day: 0.5 packs/day for 40.0 years (20.0 ttl pk-yrs)     Types: Cigarettes    Smokeless tobacco: Never   Substance Use Topics    Alcohol use: Yes    Drug use: Never     Family History   Problem Relation Name Age of Onset    Macular degeneration Mother      Cancer Maternal Grandmother      Diabetes Maternal Grandmother      Cancer Maternal Grandfather       Immunization History   Administered Date(s) Administered    COVID-19, mRNA, LNP-S, PF, 30 mcg/0.3 mL dose 03/17/2021, 04/16/2021, 11/02/2021    Flu vaccine (IIV4), preservative free *Check age/dose* 11/23/2015, 12/20/2017, 10/15/2020, 09/27/2021    Flu vaccine, quadrivalent, high-dose, preservative free, age 65y+ (FLUZONE) 09/11/2023    Influenza, seasonal, injectable 10/06/2009, 11/01/2010, 11/05/2013, 11/01/2022    Pneumococcal polysaccharide vaccine, 23-valent, age 2 years and older (PNEUMOVAX 23) 02/05/2020    Zoster, live 11/08/2022     Current Outpatient Medications   Medication Instructions    erythromycin (Romycin) 5 mg/gram (0.5 %) ophthalmic ointment Both Eyes, Nightly    hydroCHLOROthiazide  "(MICROZIDE) 12.5 mg, oral, Daily    latanoprost (Xalatan) 0.005 % ophthalmic solution ophthalmic (eye)    levothyroxine (SYNTHROID, LEVOXYL) 100 mcg, oral, Daily    meclizine (ANTIVERT) 25 mg, oral, 3 times daily PRN    olmesartan (BENICAR) 40 mg, oral, Daily    pantoprazole (PROTONIX) 40 mg, oral, Daily before breakfast    Restasis 0.05 % ophthalmic emulsion 1 drop, Both Eyes, Every 12 hours    rosuvastatin (CRESTOR) 40 mg, oral, Daily     Allergies   Allergen Reactions    Gadolinium-Containing Contrast Media Nausea Only, Other and Rash    Prochlorperazine Swelling     Objective   Vitals:    01/14/25 1021   BP: 124/80   Pulse: 66   Weight: 78.9 kg (174 lb)   Height: 1.626 m (5' 4\")     Body mass index is 29.87 kg/m².    BP Readings from Last 3 Encounters:   01/14/25 124/80   10/08/24 145/86   09/09/24 130/70      Wt Readings from Last 3 Encounters:   01/14/25 78.9 kg (174 lb)   10/08/24 78.9 kg (174 lb)   09/09/24 78.9 kg (174 lb)      Physical Exam  CONSTITUTIONAL - well nourished, well developed, looks like stated age, in no acute distress, not ill-appearing, and not tired appearing  SKIN - normal skin color and pigmentation, normal skin turgor without rash, lesions, or nodules visualized  HEAD - no trauma, normocephalic  EYES - extraocular muscles are intact, and normal external exam  CHEST - In no obvious respiratory distress  MSK -   Right knee: right sided pre patellar bursitis, normal ROM and strength. No joint line tenderness. Ligaments in tact.   Left knee: left knee without redness, warmth, ecchymosis, although there is some crepitus with extension; left knee with very little opening on the lateral aspect near the condyle. TTP along lateral joint line.     NEUROLOGICAL - alert, oriented and no focal signs  PSYCHIATRIC - alert, pleasant and cordial, age-appropriate    Joint Injection Large/Arthrocentesis: bilateral knee on 1/14/2025 11:12 AM  Indications: pain  Details: 22 G needle, anterolateral " approach  Consent was given by the patient. Immediately prior to procedure a time out was called to verify the correct patient, procedure, equipment, support staff and site/side marked as required. Patient was prepped and draped in the usual sterile fashion.       Assessment/Plan   Problem List Items Addressed This Visit       Primary osteoarthritis of left knee - Primary     Provided CSI for both acute and long-term relief. Appt with Dr. Goncalves scheduled in a couple of weeks.          Relevant Medications    triamcinolone acetonide (Kenalog-40) injection 40 mg (Completed) (Start on 1/14/2025 12:00 PM)    Chronic pain of right knee     Provided CSI. Following up with Dr. Goncalves in a few weeks.          Relevant Medications    triamcinolone acetonide (Kenalog-40) injection 40 mg (Completed) (Start on 1/14/2025 12:00 PM)

## 2025-01-14 NOTE — ASSESSMENT & PLAN NOTE
Provided CSI for both acute and long-term relief. Appt with Dr. Goncalves scheduled in a couple of weeks.

## 2025-01-20 DIAGNOSIS — E78.2 MIXED HYPERLIPIDEMIA: ICD-10-CM

## 2025-01-20 DIAGNOSIS — E03.9 ACQUIRED HYPOTHYROIDISM: ICD-10-CM

## 2025-01-20 DIAGNOSIS — I10 BENIGN ESSENTIAL HYPERTENSION: ICD-10-CM

## 2025-01-24 ENCOUNTER — LAB (OUTPATIENT)
Dept: LAB | Facility: LAB | Age: 65
End: 2025-01-24
Payer: COMMERCIAL

## 2025-01-24 DIAGNOSIS — E78.2 MIXED HYPERLIPIDEMIA: ICD-10-CM

## 2025-01-24 DIAGNOSIS — E03.9 ACQUIRED HYPOTHYROIDISM: ICD-10-CM

## 2025-01-24 DIAGNOSIS — I10 BENIGN ESSENTIAL HYPERTENSION: ICD-10-CM

## 2025-01-24 LAB
ALBUMIN SERPL BCP-MCNC: 4.6 G/DL (ref 3.4–5)
ALP SERPL-CCNC: 58 U/L (ref 33–136)
ALT SERPL W P-5'-P-CCNC: 15 U/L (ref 7–45)
ANION GAP SERPL CALC-SCNC: 17 MMOL/L (ref 10–20)
AST SERPL W P-5'-P-CCNC: 15 U/L (ref 9–39)
BILIRUB SERPL-MCNC: 0.8 MG/DL (ref 0–1.2)
BUN SERPL-MCNC: 21 MG/DL (ref 6–23)
CALCIUM SERPL-MCNC: 10.6 MG/DL (ref 8.6–10.6)
CHLORIDE SERPL-SCNC: 105 MMOL/L (ref 98–107)
CHOLEST SERPL-MCNC: 196 MG/DL (ref 0–199)
CHOLESTEROL/HDL RATIO: 2.1
CO2 SERPL-SCNC: 27 MMOL/L (ref 21–32)
CREAT SERPL-MCNC: 0.91 MG/DL (ref 0.5–1.05)
EGFRCR SERPLBLD CKD-EPI 2021: 70 ML/MIN/1.73M*2
GLUCOSE SERPL-MCNC: 81 MG/DL (ref 74–99)
HDLC SERPL-MCNC: 92 MG/DL
LDLC SERPL CALC-MCNC: 88 MG/DL
NON HDL CHOLESTEROL: 104 MG/DL (ref 0–149)
POTASSIUM SERPL-SCNC: 4.5 MMOL/L (ref 3.5–5.3)
PROT SERPL-MCNC: 7.3 G/DL (ref 6.4–8.2)
SODIUM SERPL-SCNC: 144 MMOL/L (ref 136–145)
TRIGL SERPL-MCNC: 82 MG/DL (ref 0–149)
TSH SERPL-ACNC: 2.26 MIU/L (ref 0.44–3.98)
VLDL: 16 MG/DL (ref 0–40)

## 2025-01-24 PROCEDURE — 80053 COMPREHEN METABOLIC PANEL: CPT

## 2025-01-24 PROCEDURE — 84443 ASSAY THYROID STIM HORMONE: CPT

## 2025-01-24 PROCEDURE — 80061 LIPID PANEL: CPT

## 2025-01-25 DIAGNOSIS — E78.2 MIXED HYPERLIPIDEMIA: ICD-10-CM

## 2025-01-25 DIAGNOSIS — E03.9 HYPOTHYROIDISM, UNSPECIFIED: ICD-10-CM

## 2025-01-25 DIAGNOSIS — I10 BENIGN ESSENTIAL HYPERTENSION: ICD-10-CM

## 2025-01-25 RX ORDER — LEVOTHYROXINE SODIUM 100 UG/1
100 TABLET ORAL DAILY
Qty: 90 TABLET | Refills: 1 | Status: SHIPPED | OUTPATIENT
Start: 2025-01-25 | End: 2025-07-24

## 2025-01-25 RX ORDER — HYDROCHLOROTHIAZIDE 12.5 MG/1
12.5 TABLET ORAL DAILY
Qty: 90 TABLET | Refills: 1 | Status: SHIPPED | OUTPATIENT
Start: 2025-01-25 | End: 2025-07-24

## 2025-01-25 RX ORDER — ROSUVASTATIN CALCIUM 40 MG/1
40 TABLET, COATED ORAL DAILY
Qty: 90 TABLET | Refills: 1 | Status: SHIPPED | OUTPATIENT
Start: 2025-01-25 | End: 2025-07-24

## 2025-01-25 RX ORDER — OLMESARTAN MEDOXOMIL 40 MG/1
40 TABLET ORAL DAILY
Qty: 90 TABLET | Refills: 0 | Status: SHIPPED | OUTPATIENT
Start: 2025-04-01 | End: 2025-06-30

## 2025-01-25 NOTE — RESULT ENCOUNTER NOTE
I am pleased to inform you that your recent lab results show that your thyroid, cholesterol, blood sugar, kidney, liver, and electrolytes are all within normal ranges. Congratulations on maintaining such great health!    As a result, there are no changes needed to your current medications. I have already sent over all of your prescriptions to the pharmacy, and they are set to last until .    I did notice that your prescription for olmesartan is set to  on . To ensure all your medications are on the same schedule, I will place a renewal for olmesartan to be ready for pickup at the beginning of April. This way, you won't have to make separate trips to the pharmacy.

## 2025-01-27 ENCOUNTER — APPOINTMENT (OUTPATIENT)
Dept: RADIOLOGY | Facility: CLINIC | Age: 65
End: 2025-01-27
Payer: COMMERCIAL

## 2025-02-05 ENCOUNTER — APPOINTMENT (OUTPATIENT)
Dept: ORTHOPEDIC SURGERY | Facility: CLINIC | Age: 65
End: 2025-02-05
Payer: COMMERCIAL

## 2025-02-05 DIAGNOSIS — M17.12 ARTHRITIS OF LEFT KNEE: ICD-10-CM

## 2025-02-05 DIAGNOSIS — M17.12 PRIMARY OSTEOARTHRITIS OF LEFT KNEE: ICD-10-CM

## 2025-02-05 PROCEDURE — 99214 OFFICE O/P EST MOD 30 MIN: CPT | Performed by: ORTHOPAEDIC SURGERY

## 2025-02-05 PROCEDURE — 4004F PT TOBACCO SCREEN RCVD TLK: CPT | Performed by: ORTHOPAEDIC SURGERY

## 2025-02-05 PROCEDURE — 1159F MED LIST DOCD IN RCRD: CPT | Performed by: ORTHOPAEDIC SURGERY

## 2025-02-05 PROCEDURE — 1125F AMNT PAIN NOTED PAIN PRSNT: CPT | Performed by: ORTHOPAEDIC SURGERY

## 2025-02-05 PROCEDURE — 1123F ACP DISCUSS/DSCN MKR DOCD: CPT | Performed by: ORTHOPAEDIC SURGERY

## 2025-02-05 PROCEDURE — 1160F RVW MEDS BY RX/DR IN RCRD: CPT | Performed by: ORTHOPAEDIC SURGERY

## 2025-02-05 RX ORDER — HYALURONATE SODIUM, STABILIZED 60 MG/3 ML
60 SYRINGE (ML) INTRAARTICULAR ONCE
Qty: 3 ML | Refills: 0 | Status: SHIPPED | OUTPATIENT
Start: 2025-02-05 | End: 2025-02-07

## 2025-02-05 ASSESSMENT — PAIN - FUNCTIONAL ASSESSMENT: PAIN_FUNCTIONAL_ASSESSMENT: 0-10

## 2025-02-05 ASSESSMENT — PAIN SCALES - GENERAL: PAINLEVEL_OUTOF10: 3

## 2025-02-05 NOTE — PROGRESS NOTES
This is a consultation from Dr. Cresencio Padilla DO for   Chief Complaint   Patient presents with    Left Knee - Pain       This is a 64 y.o. female who presents for evaluation of left knee pain.  Had seen her in the past for knee pain, this is a chronic longstanding issue for her but is been recent exacerbated.  She planes of sharp pain over the medial knee worse with walking proving with rest.  She has had injections which have been helpful, most recently gave her a good amount of relief.  No numbness or tingling no fevers or chills no shooting pain down the leg.  She is very active and feels like the injections are still helping but not as much as they used to.    Physical Exam    There has been no interval change in this patient's past medical, surgical, medications, allergies, family history or social history since the most recent visit to a provider within our department. 14 point review of systems was performed, reviewed, and negative except for pertinent positives documented in the history of present illness.     Constitutional: well developed, well nourished female in no acute distress  Psychiatric: normal mood, appropriate affect  Eyes: sclera anicteric  HENT: normocephalic/atraumatic  CV: regular rate and rhythm   Respiratory: non labored breathing  Integumentary: no rash  Neurological: moves all extremities    Left knee exam: skin intact no lacerations or abrations. no effusion.  Tender medial joint line. negative log roll negative patellar grind. ROM 0-120. stable to varus and valgus stress at 0 and 30 degrees. negative lachman negative posterior drawer negative robyn. 5/5 ehl/fhl/gs/ta. silt s/s/sp/dp/t. 2+ dp/pt        Xrays were ordered by me, they were reviewed and independently interpreted by me today, they show severe degenerative changes, significant progression from her previous film on the left knee with joint space narrowing    Procedures      Impression/Plan: This is a 64 y.o. female with  "severe left knee arthritis.  I had an in depth discussion with the patient regarding treatment options for arthritis and their relative risks and benefits. We reviewed surgical and nonsurgical option for treatment. Treatments include anti inflammatory medications, physical therapy, weight loss, activity modification, use of assistive devices, injection therapies. We discussed current prescriptions and risks and benefits of continuation of prescription medication as apporpriate. We discussed that arthritis is often progressive over time, an in end stage arthritis surgical interventions can be considered, including arthroplasty. All questions were answered and the patient voiced their understanding.  We discussed gel injections, we will get those ordered for today and I will see her back when they are available    BMI Readings from Last 1 Encounters:   01/14/25 29.87 kg/m²      Lab Results   Component Value Date    CREATININE 0.91 01/24/2025     Tobacco Use: High Risk (2/5/2025)    Patient History     Smoking Tobacco Use: Every Day     Smokeless Tobacco Use: Never     Passive Exposure: Not on file      Computed MELD 3.0 unavailable. One or more values for this score either were not found within the given timeframe or did not fit some other criterion.  Computed MELD-Na unavailable. One or more values for this score either were not found within the given timeframe or did not fit some other criterion.       No results found for: \"HGBA1C\"  No results found for: \"STAPHMRSASCR\"  "

## 2025-02-06 ENCOUNTER — SPECIALTY PHARMACY (OUTPATIENT)
Dept: PHARMACY | Facility: CLINIC | Age: 65
End: 2025-02-06

## 2025-02-14 ENCOUNTER — TELEPHONE (OUTPATIENT)
Dept: ORTHOPEDIC SURGERY | Facility: CLINIC | Age: 65
End: 2025-02-14
Payer: COMMERCIAL

## 2025-03-19 ENCOUNTER — APPOINTMENT (OUTPATIENT)
Dept: ORTHOPEDIC SURGERY | Facility: CLINIC | Age: 65
End: 2025-03-19
Payer: COMMERCIAL

## 2025-03-19 DIAGNOSIS — M17.12 ARTHRITIS OF LEFT KNEE: Primary | ICD-10-CM

## 2025-03-19 PROCEDURE — 1159F MED LIST DOCD IN RCRD: CPT | Performed by: ORTHOPAEDIC SURGERY

## 2025-03-19 PROCEDURE — 1160F RVW MEDS BY RX/DR IN RCRD: CPT | Performed by: ORTHOPAEDIC SURGERY

## 2025-03-19 PROCEDURE — 20610 DRAIN/INJ JOINT/BURSA W/O US: CPT | Performed by: ORTHOPAEDIC SURGERY

## 2025-03-19 PROCEDURE — 99024 POSTOP FOLLOW-UP VISIT: CPT | Performed by: ORTHOPAEDIC SURGERY

## 2025-03-19 PROCEDURE — 1125F AMNT PAIN NOTED PAIN PRSNT: CPT | Performed by: ORTHOPAEDIC SURGERY

## 2025-03-19 PROCEDURE — 4004F PT TOBACCO SCREEN RCVD TLK: CPT | Performed by: ORTHOPAEDIC SURGERY

## 2025-03-19 PROCEDURE — 1123F ACP DISCUSS/DSCN MKR DOCD: CPT | Performed by: ORTHOPAEDIC SURGERY

## 2025-03-19 ASSESSMENT — PAIN SCALES - GENERAL: PAINLEVEL_OUTOF10: 2

## 2025-03-19 ASSESSMENT — PAIN - FUNCTIONAL ASSESSMENT: PAIN_FUNCTIONAL_ASSESSMENT: 0-10

## 2025-03-19 NOTE — PROGRESS NOTES
Italia Pierce is a 64 y.o. female here for gel injection  Chief Complaint   Patient presents with    Left Knee - Pain, Arthritis     LT KNEE DUROLANE - LAURA         L Inj/Asp: L knee on 3/19/2025 11:44 AM  Indications: pain and joint swelling  Details: 22 G needle, anterolateral approach  Medications: 60 mg sodium hyaluronate 60 mg/3 mL    Discussion:  I discussed the conservative treatment options for knee osteoarthritis including but not limited to physical therapy, oral NSAIDS, activity and lifestyle modification, hyaluronic acid injections and corticosteroid injections. Pt has elected to undergo a hyaluronic acid injection today. I have explained the risk and benefits of an injection including the possibility of joint infection, bleeding, damage to cartilage, allergic reaction. Patient verbalized understanding and gave verbal consent wishes to proceed with a intra-articular hyaluronic acid injection for their knee.    Procedure:  After discussing the risk and benefits of the procedure, we proceeded with an intra-articular left knee injection. We discussed the risks and benefits and potential morbidity related to the treatment, and to the prescription medication administered in the injection    With the patient's informed verbal consent, the left knee were prepped in standard sterile fashion with Chlorhexidine. The skin was then anesthetized with ethyl chloride spray and cleaned again with Chlorhexidine. The left knee was then apirated/injected with a prefilled 20-gauge syringe of 3ml/60mg Durolane using the lateral approach without complications.  The patient tolerated this well.  A bandaid was applied and the patient ambulated out of the clinic on ther own accord without difficulty. Patient was instructed to avoid physical activity for 24-48 hours to prevent the knees from swelling and may ice the knee as tolerated. Patient should contact the office if any signs of of infection appear: redness, fever,  chills, drainage, swelling or warmth to the knee.        Procedure, treatment alternatives, risks and benefits explained, specific risks discussed. Consent was given by the patient. Immediately prior to procedure a time out was called to verify the correct patient, procedure, equipment, support staff and site/side marked as required. Patient was prepped and draped in the usual sterile fashion.

## 2025-04-15 ENCOUNTER — APPOINTMENT (OUTPATIENT)
Dept: PODIATRY | Facility: CLINIC | Age: 65
End: 2025-04-15
Payer: COMMERCIAL

## 2025-04-15 VITALS — WEIGHT: 181 LBS | BODY MASS INDEX: 30.9 KG/M2 | HEIGHT: 64 IN

## 2025-04-15 DIAGNOSIS — Q66.71 PES CAVUS OF BOTH FEET: Primary | ICD-10-CM

## 2025-04-15 DIAGNOSIS — Q66.72 PES CAVUS OF BOTH FEET: Primary | ICD-10-CM

## 2025-04-15 PROCEDURE — 1160F RVW MEDS BY RX/DR IN RCRD: CPT | Performed by: PODIATRIST

## 2025-04-15 PROCEDURE — 1159F MED LIST DOCD IN RCRD: CPT | Performed by: PODIATRIST

## 2025-04-15 PROCEDURE — 99212 OFFICE O/P EST SF 10 MIN: CPT | Performed by: PODIATRIST

## 2025-04-15 PROCEDURE — 1123F ACP DISCUSS/DSCN MKR DOCD: CPT | Performed by: PODIATRIST

## 2025-04-15 PROCEDURE — 3008F BODY MASS INDEX DOCD: CPT | Performed by: PODIATRIST

## 2025-04-15 PROCEDURE — 4004F PT TOBACCO SCREEN RCVD TLK: CPT | Performed by: PODIATRIST

## 2025-04-15 NOTE — PROGRESS NOTES
CC: orthotics    HPI:  Pateint  seen for new orthotics, last seen 2023, no new issues.    PCP: Dr. Padilla  Last visit: 1-14-25     PMH  Past Medical History:   Diagnosis Date    Breast cyst 2013    Essential (primary) hypertension 10/02/2013    Benign essential hypertension    Malignant neoplasm of unspecified site of unspecified female breast 01/24/2019    Breast cancer    NSVT (nonsustained ventricular tachycardia) (Multi) 04/02/2024    Palpitations 04/02/2024    Paroxysmal supraventricular tachycardia (CMS-HCC) 04/02/2024    Personal history of irradiation 2013    Personal history of other diseases of the nervous system and sense organs     History of glaucoma    Personal history of other endocrine, nutritional and metabolic disease     History of thyroid disease     MEDS    Current Outpatient Medications:     erythromycin (Romycin) 5 mg/gram (0.5 %) ophthalmic ointment, Apply to both eyes once daily at bedtime., Disp: , Rfl:     hydroCHLOROthiazide (Microzide) 12.5 mg tablet, Take 1 tablet (12.5 mg) by mouth once daily., Disp: 90 tablet, Rfl: 1    latanoprost (Xalatan) 0.005 % ophthalmic solution, Administer into affected eye(s)., Disp: , Rfl:     levothyroxine (Synthroid, Levoxyl) 100 mcg tablet, Take 1 tablet (100 mcg) by mouth once daily., Disp: 90 tablet, Rfl: 1    meclizine (Antivert) 25 mg tablet, Take 1 tablet (25 mg) by mouth 3 times a day as needed for dizziness., Disp: , Rfl:     olmesartan (BENIcar) 40 mg tablet, Take 1 tablet (40 mg) by mouth once daily. Do not fill before April 1, 2025., Disp: 90 tablet, Rfl: 0    pantoprazole (ProtoNix) 40 mg EC tablet, Take 1 tablet (40 mg) by mouth once daily in the morning. Take before meals., Disp: , Rfl:     Restasis 0.05 % ophthalmic emulsion, Administer 1 drop into both eyes every 12 hours., Disp: , Rfl:     rosuvastatin (Crestor) 40 mg tablet, Take 1 tablet (40 mg) by mouth once daily., Disp: 90 tablet, Rfl: 1  Allergies  Allergies   Allergen Reactions     "Gadolinium-Containing Contrast Media Nausea Only, Other and Rash    Prochlorperazine Swelling     Social History     Socioeconomic History    Marital status:    Tobacco Use    Smoking status: Every Day     Current packs/day: 0.50     Average packs/day: 0.5 packs/day for 40.0 years (20.0 ttl pk-yrs)     Types: Cigarettes    Smokeless tobacco: Never   Substance and Sexual Activity    Alcohol use: Yes    Drug use: Never     Family History   Problem Relation Name Age of Onset    Macular degeneration Mother      Cancer Maternal Grandmother      Diabetes Maternal Grandmother      Cancer Maternal Grandfather       Past Surgical History:   Procedure Laterality Date    BREAST BIOPSY Right 2020    benign    BREAST LUMPECTOMY Right 06/16/2014    Right Breast Lumpectomy    OTHER SURGICAL HISTORY  10/18/2013    Liposuction    OTHER SURGICAL HISTORY  10/18/2013    Otoplasty    OTHER SURGICAL HISTORY  02/10/2020    [place] lymph node biopsy procedure    REFRACTIVE SURGERY  10/18/2013    Corneal LASIK    TONSILLECTOMY  10/18/2013    Tonsillectomy       REVIEW OF SYSTEMS    + as noted above in HPI.       Physical examination:   On General Observation: Patient is a pleasant, cooperative, well developed 64 y.o.  adult female. The patient is alert and oriented to time, place and person. Patient has normal affect and mood.  Ht 1.626 m (5' 4\")   Wt 82.1 kg (181 lb)   BMI 31.07 kg/m²     Vascular:  DP and PT pulses are 2/4 b/l.  no edema noted. mild varicosities b/l.  CFT 5 seconds to all digits bilateral.  Skin temperature is warm to warm from proximal to distal bilateral.      Muscular: Strength is 5/5 b/l.  Motor strength is normal and symmetric proximally, as well as distally, in all four extremities. Good mobility of all extremities.  Tenderness to feet.     Neuro:  Proprioception present.   Sensation to vibration is present. Protective sensation present  at all pedal sites via [place] Fletcher 5.07 monofilament bilateral.  " Light touch present bilateral.     Derm:  No rashes, lesions, or ecchymosis.     Ortho:  Cavus foot is present, hammtrues b/l le      ASSESSMENT:    Pes Cavus     PLAN:   Exam  Reviewed with pt, orthotics worked well in the past, will check benefits and follow up.    David Reilly DPM

## 2025-04-25 ENCOUNTER — TELEPHONE (OUTPATIENT)
Dept: PRIMARY CARE | Facility: CLINIC | Age: 65
End: 2025-04-25
Payer: COMMERCIAL

## 2025-04-25 NOTE — TELEPHONE ENCOUNTER
----- Message from David Reilly sent at 4/15/2025  1:08 PM EDT -----  Regarding: orthotics  Please check orthotics dx is pes cavus thanks.

## 2025-05-08 ENCOUNTER — APPOINTMENT (OUTPATIENT)
Dept: PODIATRY | Facility: CLINIC | Age: 65
End: 2025-05-08
Payer: COMMERCIAL

## 2025-05-08 VITALS — BODY MASS INDEX: 30.9 KG/M2 | HEIGHT: 64 IN | WEIGHT: 181 LBS

## 2025-05-08 DIAGNOSIS — Q66.72 PES CAVUS OF BOTH FEET: Primary | ICD-10-CM

## 2025-05-08 DIAGNOSIS — Q66.72 PES CAVUS OF LEFT FOOT: ICD-10-CM

## 2025-05-08 DIAGNOSIS — Q66.71 PES CAVUS OF BOTH FEET: Primary | ICD-10-CM

## 2025-05-08 DIAGNOSIS — Q66.71 PES CAVUS OF RIGHT FOOT: ICD-10-CM

## 2025-05-08 PROCEDURE — 1159F MED LIST DOCD IN RCRD: CPT | Performed by: PODIATRIST

## 2025-05-08 PROCEDURE — 3008F BODY MASS INDEX DOCD: CPT | Performed by: PODIATRIST

## 2025-05-08 PROCEDURE — 1160F RVW MEDS BY RX/DR IN RCRD: CPT | Performed by: PODIATRIST

## 2025-05-08 PROCEDURE — 4004F PT TOBACCO SCREEN RCVD TLK: CPT | Performed by: PODIATRIST

## 2025-05-08 PROCEDURE — L3020 FOOT LONGITUD/METATARSAL SUP: HCPCS | Performed by: PODIATRIST

## 2025-05-08 NOTE — PROGRESS NOTES
CC: orthotics     HPI:  Pateint  seen for new orthotics.     PCP: Dr. Padilla  Last visit: 1-14-25      PMH  Medical History        Past Medical History:   Diagnosis Date    Breast cyst 2013    Essential (primary) hypertension 10/02/2013     Benign essential hypertension    Malignant neoplasm of unspecified site of unspecified female breast 01/24/2019     Breast cancer    NSVT (nonsustained ventricular tachycardia) (Multi) 04/02/2024    Palpitations 04/02/2024    Paroxysmal supraventricular tachycardia (CMS-HCC) 04/02/2024    Personal history of irradiation 2013    Personal history of other diseases of the nervous system and sense organs       History of glaucoma    Personal history of other endocrine, nutritional and metabolic disease       History of thyroid disease         MEDS    Current Medications      Current Outpatient Medications:     erythromycin (Romycin) 5 mg/gram (0.5 %) ophthalmic ointment, Apply to both eyes once daily at bedtime., Disp: , Rfl:     hydroCHLOROthiazide (Microzide) 12.5 mg tablet, Take 1 tablet (12.5 mg) by mouth once daily., Disp: 90 tablet, Rfl: 1    latanoprost (Xalatan) 0.005 % ophthalmic solution, Administer into affected eye(s)., Disp: , Rfl:     levothyroxine (Synthroid, Levoxyl) 100 mcg tablet, Take 1 tablet (100 mcg) by mouth once daily., Disp: 90 tablet, Rfl: 1    meclizine (Antivert) 25 mg tablet, Take 1 tablet (25 mg) by mouth 3 times a day as needed for dizziness., Disp: , Rfl:     olmesartan (BENIcar) 40 mg tablet, Take 1 tablet (40 mg) by mouth once daily. Do not fill before April 1, 2025., Disp: 90 tablet, Rfl: 0    pantoprazole (ProtoNix) 40 mg EC tablet, Take 1 tablet (40 mg) by mouth once daily in the morning. Take before meals., Disp: , Rfl:     Restasis 0.05 % ophthalmic emulsion, Administer 1 drop into both eyes every 12 hours., Disp: , Rfl:     rosuvastatin (Crestor) 40 mg tablet, Take 1 tablet (40 mg) by mouth once daily., Disp: 90 tablet, Rfl: 1  "    Allergies  Allergies        Allergies   Allergen Reactions    Gadolinium-Containing Contrast Media Nausea Only, Other and Rash    Prochlorperazine Swelling         Social History   Social History            Socioeconomic History    Marital status:    Tobacco Use    Smoking status: Every Day       Current packs/day: 0.50       Average packs/day: 0.5 packs/day for 40.0 years (20.0 ttl pk-yrs)       Types: Cigarettes    Smokeless tobacco: Never   Substance and Sexual Activity    Alcohol use: Yes    Drug use: Never         Family History          Family History   Problem Relation Name Age of Onset    Macular degeneration Mother        Cancer Maternal Grandmother        Diabetes Maternal Grandmother        Cancer Maternal Grandfather             Surgical History         Past Surgical History:   Procedure Laterality Date    BREAST BIOPSY Right 2020     benign    BREAST LUMPECTOMY Right 06/16/2014     Right Breast Lumpectomy    OTHER SURGICAL HISTORY   10/18/2013     Liposuction    OTHER SURGICAL HISTORY   10/18/2013     Otoplasty    OTHER SURGICAL HISTORY   02/10/2020     [place] lymph node biopsy procedure    REFRACTIVE SURGERY   10/18/2013     Corneal LASIK    TONSILLECTOMY   10/18/2013     Tonsillectomy            REVIEW OF SYSTEMS     + as noted above in HPI.         Physical examination:   On General Observation: Patient is a pleasant, cooperative, well developed 64 y.o.  adult female. The patient is alert and oriented to time, place and person. Patient has normal affect and mood.  Ht 1.626 m (5' 4\")   Wt 82.1 kg (181 lb)   BMI 31.07 kg/m²      Vascular:  DP and PT pulses are 2/4 b/l.  no edema noted. mild varicosities b/l.  CFT 5 seconds to all digits bilateral.  Skin temperature is warm to warm from proximal to distal bilateral.       Muscular: Strength is 5/5 b/l.  Motor strength is normal and symmetric proximally, as well as distally, in all four extremities. Good mobility of all extremities.  " Tenderness to feet.      Neuro:  Proprioception present.   Sensation to vibration is present. Protective sensation present  at all pedal sites via [place] Fletcher 5.07 monofilament bilateral.  Light touch present bilateral.      Derm:  No rashes, lesions, or ecchymosis.      Ortho:  Cavus foot is present, hammertoes b/l le        ASSESSMENT:    Pes Cavus      PLAN:   Casted for custom orthotics biofoam, will call when arrives.  David Reilly DPM

## 2025-06-02 ENCOUNTER — TELEPHONE (OUTPATIENT)
Dept: PRIMARY CARE | Facility: CLINIC | Age: 65
End: 2025-06-02
Payer: COMMERCIAL

## 2025-06-02 NOTE — TELEPHONE ENCOUNTER
Your orthotics are in.  Please schedule an appointment at your earliest convenience with Dr Reilly to get your new inserts.  You can call [phone] or schedule through [employer].   Thank you.

## 2025-07-07 ENCOUNTER — APPOINTMENT (OUTPATIENT)
Facility: CLINIC | Age: 65
End: 2025-07-07
Payer: COMMERCIAL

## 2025-07-16 ENCOUNTER — APPOINTMENT (OUTPATIENT)
Dept: PODIATRY | Facility: CLINIC | Age: 65
End: 2025-07-16
Payer: COMMERCIAL

## 2025-07-16 DIAGNOSIS — Q66.72 PES CAVUS OF BOTH FEET: Primary | ICD-10-CM

## 2025-07-16 DIAGNOSIS — Q66.71 PES CAVUS OF BOTH FEET: Primary | ICD-10-CM

## 2025-07-16 PROCEDURE — 1159F MED LIST DOCD IN RCRD: CPT | Performed by: PODIATRIST

## 2025-07-16 NOTE — PROGRESS NOTES
CC: orthotics     HPI:  Pateint  seen for new orthotics.     PCP: Dr. Padilla  Last visit: 1-14-25      PMH  Medical History           Past Medical History:   Diagnosis Date    Breast cyst 2013    Essential (primary) hypertension 10/02/2013     Benign essential hypertension    Malignant neoplasm of unspecified site of unspecified female breast 01/24/2019     Breast cancer    NSVT (nonsustained ventricular tachycardia) (Multi) 04/02/2024    Palpitations 04/02/2024    Paroxysmal supraventricular tachycardia (CMS-HCC) 04/02/2024    Personal history of irradiation 2013    Personal history of other diseases of the nervous system and sense organs       History of glaucoma    Personal history of other endocrine, nutritional and metabolic disease       History of thyroid disease         MEDS     Current Medications      Current Outpatient Medications:     erythromycin (Romycin) 5 mg/gram (0.5 %) ophthalmic ointment, Apply to both eyes once daily at bedtime., Disp: , Rfl:     hydroCHLOROthiazide (Microzide) 12.5 mg tablet, Take 1 tablet (12.5 mg) by mouth once daily., Disp: 90 tablet, Rfl: 1    latanoprost (Xalatan) 0.005 % ophthalmic solution, Administer into affected eye(s)., Disp: , Rfl:     levothyroxine (Synthroid, Levoxyl) 100 mcg tablet, Take 1 tablet (100 mcg) by mouth once daily., Disp: 90 tablet, Rfl: 1    meclizine (Antivert) 25 mg tablet, Take 1 tablet (25 mg) by mouth 3 times a day as needed for dizziness., Disp: , Rfl:     olmesartan (BENIcar) 40 mg tablet, Take 1 tablet (40 mg) by mouth once daily. Do not fill before April 1, 2025., Disp: 90 tablet, Rfl: 0    pantoprazole (ProtoNix) 40 mg EC tablet, Take 1 tablet (40 mg) by mouth once daily in the morning. Take before meals., Disp: , Rfl:     Restasis 0.05 % ophthalmic emulsion, Administer 1 drop into both eyes every 12 hours., Disp: , Rfl:     rosuvastatin (Crestor) 40 mg tablet, Take 1 tablet (40 mg) by mouth once daily., Disp: 90 tablet, Rfl: 1     "  Allergies  Allergies           Allergies   Allergen Reactions    Gadolinium-Containing Contrast Media Nausea Only, Other and Rash    Prochlorperazine Swelling         Social History   Social History                Socioeconomic History    Marital status:    Tobacco Use    Smoking status: Every Day       Current packs/day: 0.50       Average packs/day: 0.5 packs/day for 40.0 years (20.0 ttl pk-yrs)       Types: Cigarettes    Smokeless tobacco: Never   Substance and Sexual Activity    Alcohol use: Yes    Drug use: Never         Family History               Family History   Problem Relation Name Age of Onset    Macular degeneration Mother        Cancer Maternal Grandmother        Diabetes Maternal Grandmother        Cancer Maternal Grandfather             Surgical History             Past Surgical History:   Procedure Laterality Date    BREAST BIOPSY Right 2020     benign    BREAST LUMPECTOMY Right 06/16/2014     Right Breast Lumpectomy    OTHER SURGICAL HISTORY   10/18/2013     Liposuction    OTHER SURGICAL HISTORY   10/18/2013     Otoplasty    OTHER SURGICAL HISTORY   02/10/2020     [place] lymph node biopsy procedure    REFRACTIVE SURGERY   10/18/2013     Corneal LASIK    TONSILLECTOMY   10/18/2013     Tonsillectomy            REVIEW OF SYSTEMS     + as noted above in HPI.         Physical examination:   On General Observation: Patient is a pleasant, cooperative, well developed 64 y.o.  adult female. The patient is alert and oriented to time, place and person. Patient has normal affect and mood.  Ht 1.626 m (5' 4\")   Wt 82.1 kg (181 lb)   BMI 31.07 kg/m²      Vascular:  DP and PT pulses are 2/4 b/l.  no edema noted. mild varicosities b/l.  CFT 5 seconds to all digits bilateral.  Skin temperature is warm to warm from proximal to distal bilateral.       Muscular: Strength is 5/5 b/l.  Motor strength is normal and symmetric proximally, as well as distally, in all four extremities. Good mobility of all " extremities.  Tenderness to feet.      Neuro:  Proprioception present.   Sensation to vibration is present. Protective sensation present  at all pedal sites via [place] Fletcher 5.07 monofilament bilateral.  Light touch present bilateral.      Derm:  No rashes, lesions, or ecchymosis.      Ortho:  Cavus foot is present, hammertoes b/l le        ASSESSMENT:    Pes Cavus      PLAN:   Dispensed orthotics with oral and written instructions, follow up 4 weeks if needed.  David Reilly DPM

## 2025-07-17 DIAGNOSIS — E03.9 ACQUIRED HYPOTHYROIDISM: ICD-10-CM

## 2025-07-17 DIAGNOSIS — E78.2 MIXED HYPERLIPIDEMIA: ICD-10-CM

## 2025-07-17 DIAGNOSIS — I10 BENIGN ESSENTIAL HYPERTENSION: Primary | ICD-10-CM

## 2025-07-17 RX ORDER — HYDROCHLOROTHIAZIDE 12.5 MG/1
12.5 TABLET ORAL DAILY
Qty: 90 TABLET | Refills: 0 | Status: SHIPPED | OUTPATIENT
Start: 2025-07-17 | End: 2025-10-15

## 2025-07-29 DIAGNOSIS — E78.2 MIXED HYPERLIPIDEMIA: ICD-10-CM

## 2025-07-29 LAB
ALBUMIN SERPL-MCNC: 4.5 G/DL (ref 3.6–5.1)
ALP SERPL-CCNC: 56 U/L (ref 37–153)
ALT SERPL-CCNC: 18 U/L (ref 6–29)
ANION GAP SERPL CALCULATED.4IONS-SCNC: 15 MMOL/L (CALC) (ref 7–17)
AST SERPL-CCNC: 21 U/L (ref 10–35)
BILIRUB SERPL-MCNC: 0.7 MG/DL (ref 0.2–1.2)
BUN SERPL-MCNC: 21 MG/DL (ref 7–25)
CALCIUM SERPL-MCNC: 9.9 MG/DL (ref 8.6–10.4)
CHLORIDE SERPL-SCNC: 101 MMOL/L (ref 98–110)
CHOLEST SERPL-MCNC: 205 MG/DL
CHOLEST/HDLC SERPL: 2.2 (CALC)
CO2 SERPL-SCNC: 25 MMOL/L (ref 20–32)
CREAT SERPL-MCNC: 0.95 MG/DL (ref 0.5–1.05)
EGFRCR SERPLBLD CKD-EPI 2021: 66 ML/MIN/1.73M2
GLUCOSE SERPL-MCNC: 87 MG/DL (ref 65–99)
HDLC SERPL-MCNC: 94 MG/DL
LDLC SERPL CALC-MCNC: 89 MG/DL (CALC)
NONHDLC SERPL-MCNC: 111 MG/DL (CALC)
POTASSIUM SERPL-SCNC: 4.1 MMOL/L (ref 3.5–5.3)
PROT SERPL-MCNC: 7.1 G/DL (ref 6.1–8.1)
SODIUM SERPL-SCNC: 141 MMOL/L (ref 135–146)
TRIGL SERPL-MCNC: 121 MG/DL
TSH SERPL-ACNC: 1.01 MIU/L (ref 0.4–4.5)

## 2025-07-29 RX ORDER — ROSUVASTATIN CALCIUM 40 MG/1
40 TABLET, COATED ORAL DAILY
Qty: 90 TABLET | Refills: 0 | Status: SHIPPED | OUTPATIENT
Start: 2025-07-29 | End: 2025-10-27

## 2025-08-08 ENCOUNTER — APPOINTMENT (OUTPATIENT)
Facility: CLINIC | Age: 65
End: 2025-08-08
Payer: COMMERCIAL

## 2025-08-08 VITALS
BODY MASS INDEX: 30.9 KG/M2 | SYSTOLIC BLOOD PRESSURE: 129 MMHG | HEART RATE: 60 BPM | DIASTOLIC BLOOD PRESSURE: 81 MMHG | HEIGHT: 64 IN | WEIGHT: 181 LBS

## 2025-08-08 DIAGNOSIS — Z01.419 ENCOUNTER FOR GYNECOLOGICAL EXAMINATION WITHOUT ABNORMAL FINDING: ICD-10-CM

## 2025-08-08 DIAGNOSIS — Z13.820 ENCOUNTER FOR IMAGING TO ASSESS OSTEOPOROSIS: Primary | ICD-10-CM

## 2025-08-08 PROCEDURE — 87626 HPV SEP HI-RSK TYP&POOL RSLT: CPT

## 2025-08-08 PROCEDURE — 88175 CYTOPATH C/V AUTO FLUID REDO: CPT

## 2025-08-08 PROCEDURE — 3079F DIAST BP 80-89 MM HG: CPT | Performed by: OBSTETRICS & GYNECOLOGY

## 2025-08-08 PROCEDURE — 3008F BODY MASS INDEX DOCD: CPT | Performed by: OBSTETRICS & GYNECOLOGY

## 2025-08-08 PROCEDURE — 99397 PER PM REEVAL EST PAT 65+ YR: CPT | Performed by: OBSTETRICS & GYNECOLOGY

## 2025-08-08 PROCEDURE — 3074F SYST BP LT 130 MM HG: CPT | Performed by: OBSTETRICS & GYNECOLOGY

## 2025-08-08 PROCEDURE — 1159F MED LIST DOCD IN RCRD: CPT | Performed by: OBSTETRICS & GYNECOLOGY

## 2025-08-08 ASSESSMENT — COLUMBIA-SUICIDE SEVERITY RATING SCALE - C-SSRS
2. HAVE YOU ACTUALLY HAD ANY THOUGHTS OF KILLING YOURSELF?: NO
1. IN THE PAST MONTH, HAVE YOU WISHED YOU WERE DEAD OR WISHED YOU COULD GO TO SLEEP AND NOT WAKE UP?: NO
6. HAVE YOU EVER DONE ANYTHING, STARTED TO DO ANYTHING, OR PREPARED TO DO ANYTHING TO END YOUR LIFE?: NO

## 2025-08-08 ASSESSMENT — ENCOUNTER SYMPTOMS
DEPRESSION: 0
OCCASIONAL FEELINGS OF UNSTEADINESS: 0
LOSS OF SENSATION IN FEET: 0

## 2025-08-08 ASSESSMENT — PATIENT HEALTH QUESTIONNAIRE - PHQ9
2. FEELING DOWN, DEPRESSED OR HOPELESS: NOT AT ALL
1. LITTLE INTEREST OR PLEASURE IN DOING THINGS: NOT AT ALL
SUM OF ALL RESPONSES TO PHQ9 QUESTIONS 1 AND 2: 0

## 2025-08-08 NOTE — PROGRESS NOTES
Subjective Italia Pierce is a 65 y.o. female here for a routine exam.  She has a history of right breast cancer.  History of tamoxifen use.    She has no postmenopausal bleeding or discharge.  No dysuria or change in bowel habits.  She is current on her Cologuard from 2023.    A bone density in 2022 showed osteopenia, T-score -2.3.    She still works full-time in [employer] but she is planning to retire soon.    Personal health questionnaire reviewed: yes.     Gynecologic History  No LMP recorded. Patient is postmenopausal.  Contraception: post menopausal status  Last Pap: 22. Results were: normal  Last mammogram: 25. Results were: normal    Obstetric History  OB History    Para Term  AB Living   0 0 0      SAB IAB Ectopic Multiple Live Births              Objective   Constitutional: Alert and in no acute distress. Well developed, well nourished.   Head and Face: Head and face: Normal.    Eyes: Normal external exam - nonicteric sclera, extraocular movements intact (EOMI) and no ptosis.   Neck: No neck asymmetry. Supple. Thyroid not enlarged and there were no palpable thyroid nodules.    Pulmonary: No respiratory distress.   Chest: Breasts: Normal appearance, no nipple discharge and no skin changes. Palpation of breasts and axillae: No palpable mass and no axillary lymphadenopathy.   Abdomen: Soft nontender; no abdominal mass palpated. No organomegaly. No hernias.   Genitourinary: External genitalia: Normal. No inguinal lymphadenopathy. Bartholin's Urethral and Skenes Glands: Normal. Urethra: Normal.  Bladder: Normal on palpation. Vagina: Normal. Cervix: Normal.  Uterus: Normal.  Right Adnexa/parametria: Normal.  Left Adnexa/parametria: Normal.  Inspection of Perianal Area: Normal.   Musculoskeletal: No joint swelling seen, normal movements of all extremities.   Skin: Normal skin color and pigmentation, normal skin turgor, and no rash.   Neurologic: Non-focal.  Grossly intact.   Psychiatric: Alert and oriented x 3. Affect normal to patient baseline. Mood: Appropriate.  Physical Exam     Assessment/Plan   Healthy female exam.  This is a 65-year-old female with a normal exam.  A Pap smear was sent.    Her routine mammogram was ordered with tomosynthesis.    A bone density test was ordered to monitor the osteopenia.  I do recommend dietary calcium, vitamin D supplement and weightbearing exercise.    She will follow-up in 1 year.  Education reviewed: self breast exams.  Mammogram ordered.

## 2025-08-21 ENCOUNTER — APPOINTMENT (OUTPATIENT)
Dept: RADIOLOGY | Facility: CLINIC | Age: 65
End: 2025-08-21
Payer: COMMERCIAL

## 2025-08-21 DIAGNOSIS — Z13.820 ENCOUNTER FOR IMAGING TO ASSESS OSTEOPOROSIS: ICD-10-CM

## 2025-08-21 PROCEDURE — 77080 DXA BONE DENSITY AXIAL: CPT

## 2025-08-22 LAB
CYTOLOGY CMNT CVX/VAG CYTO-IMP: NORMAL
HPV HR 12 DNA GENITAL QL NAA+PROBE: NEGATIVE
HPV HR GENOTYPES PNL CVX NAA+PROBE: NEGATIVE
HPV16 DNA SPEC QL NAA+PROBE: NEGATIVE
HPV18 DNA SPEC QL NAA+PROBE: NEGATIVE
LAB AP HISTORY OF MALIGNANCY: NORMAL
LAB AP HPV GENOTYPE QUESTION: YES
LAB AP HPV HR: NORMAL
LABORATORY COMMENT REPORT: NORMAL
MENSTRUAL HX REPORTED: NORMAL
PATH REPORT.TOTAL CANCER: NORMAL

## 2025-08-25 DIAGNOSIS — M81.0 AGE-RELATED OSTEOPOROSIS WITHOUT CURRENT PATHOLOGICAL FRACTURE: Primary | ICD-10-CM

## 2025-08-25 RX ORDER — ALENDRONATE SODIUM 70 MG/1
70 TABLET ORAL
Qty: 12 TABLET | Refills: 3 | Status: SHIPPED | OUTPATIENT
Start: 2025-08-25 | End: 2026-08-25

## 2025-08-26 ENCOUNTER — APPOINTMENT (OUTPATIENT)
Dept: PRIMARY CARE | Facility: CLINIC | Age: 65
End: 2025-08-26
Payer: COMMERCIAL

## 2025-08-26 VITALS
HEART RATE: 85 BPM | DIASTOLIC BLOOD PRESSURE: 76 MMHG | WEIGHT: 178 LBS | HEIGHT: 64 IN | OXYGEN SATURATION: 96 % | BODY MASS INDEX: 30.39 KG/M2 | SYSTOLIC BLOOD PRESSURE: 122 MMHG

## 2025-08-26 DIAGNOSIS — R39.9 UTI SYMPTOMS: ICD-10-CM

## 2025-08-26 DIAGNOSIS — M81.0 AGE-RELATED OSTEOPOROSIS WITHOUT CURRENT PATHOLOGICAL FRACTURE: ICD-10-CM

## 2025-08-26 DIAGNOSIS — E03.9 ACQUIRED HYPOTHYROIDISM: ICD-10-CM

## 2025-08-26 DIAGNOSIS — M17.12 PRIMARY OSTEOARTHRITIS OF LEFT KNEE: ICD-10-CM

## 2025-08-26 DIAGNOSIS — I44.7 LEFT BUNDLE BRANCH BLOCK (LBBB): ICD-10-CM

## 2025-08-26 DIAGNOSIS — Z76.89 TRANSFER REQUESTED FOR CONTINUITY OF CARE: Primary | ICD-10-CM

## 2025-08-26 PROCEDURE — 3074F SYST BP LT 130 MM HG: CPT | Performed by: NURSE PRACTITIONER

## 2025-08-26 PROCEDURE — 99214 OFFICE O/P EST MOD 30 MIN: CPT | Performed by: NURSE PRACTITIONER

## 2025-08-26 PROCEDURE — 3078F DIAST BP <80 MM HG: CPT | Performed by: NURSE PRACTITIONER

## 2025-08-26 PROCEDURE — 1159F MED LIST DOCD IN RCRD: CPT | Performed by: NURSE PRACTITIONER

## 2025-08-26 PROCEDURE — 3008F BODY MASS INDEX DOCD: CPT | Performed by: NURSE PRACTITIONER

## 2025-08-26 RX ORDER — SULFAMETHOXAZOLE AND TRIMETHOPRIM 800; 160 MG/1; MG/1
1 TABLET ORAL 2 TIMES DAILY
Qty: 10 TABLET | Refills: 0 | Status: SHIPPED | OUTPATIENT
Start: 2025-08-26 | End: 2025-09-01

## 2025-08-26 RX ORDER — TIRZEPATIDE 2.5 MG/.5ML
2.5 INJECTION, SOLUTION SUBCUTANEOUS WEEKLY
Qty: 4 PEN | Refills: 0 | Status: SHIPPED | OUTPATIENT
Start: 2025-08-26 | End: 2025-08-29

## 2025-08-26 ASSESSMENT — ENCOUNTER SYMPTOMS
HEADACHES: 0
FATIGUE: 0
SHORTNESS OF BREATH: 0
GASTROINTESTINAL NEGATIVE: 1
COUGH: 0
ACTIVITY CHANGE: 1
SLEEP DISTURBANCE: 0
DIZZINESS: 0
PALPITATIONS: 0
NUMBNESS: 0
APPETITE CHANGE: 0
CHEST TIGHTNESS: 0

## 2025-08-29 ENCOUNTER — PATIENT MESSAGE (OUTPATIENT)
Dept: PRIMARY CARE | Facility: CLINIC | Age: 65
End: 2025-08-29
Payer: COMMERCIAL

## 2025-08-29 DIAGNOSIS — M17.12 PRIMARY OSTEOARTHRITIS OF LEFT KNEE: ICD-10-CM

## 2025-08-29 DIAGNOSIS — I44.7 LEFT BUNDLE BRANCH BLOCK: ICD-10-CM

## 2025-08-29 DIAGNOSIS — E03.9 ACQUIRED HYPOTHYROIDISM: ICD-10-CM

## 2025-08-29 DIAGNOSIS — M81.0 AGE-RELATED OSTEOPOROSIS WITHOUT CURRENT PATHOLOGICAL FRACTURE: ICD-10-CM

## 2025-09-19 ENCOUNTER — APPOINTMENT (OUTPATIENT)
Dept: ORTHOPEDIC SURGERY | Facility: CLINIC | Age: 65
End: 2025-09-19
Payer: COMMERCIAL

## 2025-09-23 ENCOUNTER — APPOINTMENT (OUTPATIENT)
Dept: PRIMARY CARE | Facility: CLINIC | Age: 65
End: 2025-09-23
Payer: COMMERCIAL